# Patient Record
Sex: FEMALE | Race: WHITE | NOT HISPANIC OR LATINO
[De-identification: names, ages, dates, MRNs, and addresses within clinical notes are randomized per-mention and may not be internally consistent; named-entity substitution may affect disease eponyms.]

---

## 2017-01-04 ENCOUNTER — LABORATORY RESULT (OUTPATIENT)
Age: 72
End: 2017-01-04

## 2017-01-04 ENCOUNTER — APPOINTMENT (OUTPATIENT)
Dept: GYNECOLOGIC ONCOLOGY | Facility: CLINIC | Age: 72
End: 2017-01-04

## 2017-01-04 VITALS
HEIGHT: 55 IN | BODY MASS INDEX: 23.84 KG/M2 | DIASTOLIC BLOOD PRESSURE: 93 MMHG | WEIGHT: 103 LBS | HEART RATE: 75 BPM | SYSTOLIC BLOOD PRESSURE: 136 MMHG

## 2017-01-05 LAB
ALBUMIN SERPL ELPH-MCNC: 3.7 G/DL
ALP BLD-CCNC: 54 U/L
ALT SERPL-CCNC: 14 U/L
ANION GAP SERPL CALC-SCNC: 16 MMOL/L
APPEARANCE: CLEAR
AST SERPL-CCNC: 24 U/L
BACTERIA: NEGATIVE
BASOPHILS # BLD AUTO: 0.02 K/UL
BASOPHILS NFR BLD AUTO: 0.6 %
BILIRUB SERPL-MCNC: 0.3 MG/DL
BILIRUBIN URINE: NEGATIVE
BLOOD URINE: NEGATIVE
BUN SERPL-MCNC: 13 MG/DL
CALCIUM SERPL-MCNC: 9.3 MG/DL
CANCER AG125 SERPL-ACNC: 22 U/ML
CHLORIDE SERPL-SCNC: 99 MMOL/L
CO2 SERPL-SCNC: 24 MMOL/L
COLOR: ABNORMAL
CREAT SERPL-MCNC: 0.77 MG/DL
EOSINOPHIL # BLD AUTO: 0.05 K/UL
EOSINOPHIL NFR BLD AUTO: 1.6 %
GLUCOSE QUALITATIVE U: NORMAL MG/DL
GLUCOSE SERPL-MCNC: 99 MG/DL
HCT VFR BLD CALC: 36.4 %
HGB BLD-MCNC: 11.8 G/DL
HYALINE CASTS: 1 /LPF
IMM GRANULOCYTES NFR BLD AUTO: 0.3 %
KETONES URINE: NEGATIVE
LEUKOCYTE ESTERASE URINE: NEGATIVE
LYMPHOCYTES # BLD AUTO: 0.81 K/UL
LYMPHOCYTES NFR BLD AUTO: 25.3 %
MAN DIFF?: NORMAL
MCHC RBC-ENTMCNC: 32.4 GM/DL
MCHC RBC-ENTMCNC: 34.4 PG
MCV RBC AUTO: 106.1 FL
MICROSCOPIC-UA: NORMAL
MONOCYTES # BLD AUTO: 0.24 K/UL
MONOCYTES NFR BLD AUTO: 7.5 %
NEUTROPHILS # BLD AUTO: 2.07 K/UL
NEUTROPHILS NFR BLD AUTO: 64.7 %
NITRITE URINE: NEGATIVE
PH URINE: 6.5
PLATELET # BLD AUTO: 226 K/UL
POTASSIUM SERPL-SCNC: 4.7 MMOL/L
PROT SERPL-MCNC: 6.3 G/DL
PROTEIN URINE: NEGATIVE MG/DL
RBC # BLD: 3.43 M/UL
RBC # FLD: 15.8 %
RED BLOOD CELLS URINE: 11 /HPF
SODIUM SERPL-SCNC: 139 MMOL/L
SPECIFIC GRAVITY URINE: 1.01
SQUAMOUS EPITHELIAL CELLS: 2 /HPF
UROBILINOGEN URINE: NORMAL MG/DL
WBC # FLD AUTO: 3.2 K/UL
WHITE BLOOD CELLS URINE: 2 /HPF

## 2017-01-09 RX ORDER — DIPHENHYDRAMINE HCL 50 MG
25 CAPSULE ORAL ONCE
Qty: 0 | Refills: 0 | Status: DISCONTINUED | OUTPATIENT
Start: 2017-01-10 | End: 2017-01-25

## 2017-01-09 RX ORDER — BEVACIZUMAB 400 MG/16ML
350 INJECTION, SOLUTION INTRAVENOUS ONCE
Qty: 0 | Refills: 0 | Status: DISCONTINUED | OUTPATIENT
Start: 2017-01-10 | End: 2017-01-25

## 2017-01-09 RX ORDER — DOXORUBICIN HYDROCHLORIDE 2 MG/ML
28 INJECTABLE, LIPOSOMAL INTRAVENOUS ONCE
Qty: 0 | Refills: 0 | Status: DISCONTINUED | OUTPATIENT
Start: 2017-01-10 | End: 2017-01-25

## 2017-01-09 RX ORDER — DEXAMETHASONE 0.5 MG/5ML
20 ELIXIR ORAL ONCE
Qty: 0 | Refills: 0 | Status: DISCONTINUED | OUTPATIENT
Start: 2017-01-10 | End: 2017-01-25

## 2017-01-10 ENCOUNTER — OUTPATIENT (OUTPATIENT)
Dept: OUTPATIENT SERVICES | Facility: HOSPITAL | Age: 72
LOS: 1 days | End: 2017-01-10
Payer: MEDICARE

## 2017-01-10 DIAGNOSIS — R11.2 NAUSEA WITH VOMITING, UNSPECIFIED: ICD-10-CM

## 2017-01-10 DIAGNOSIS — C54.1 MALIGNANT NEOPLASM OF ENDOMETRIUM: ICD-10-CM

## 2017-01-10 DIAGNOSIS — Z98.89 OTHER SPECIFIED POSTPROCEDURAL STATES: Chronic | ICD-10-CM

## 2017-01-10 PROCEDURE — 96413 CHEMO IV INFUSION 1 HR: CPT

## 2017-01-10 PROCEDURE — 96417 CHEMO IV INFUS EACH ADDL SEQ: CPT

## 2017-01-10 PROCEDURE — 96375 TX/PRO/DX INJ NEW DRUG ADDON: CPT

## 2017-01-10 RX ORDER — FAMOTIDINE 10 MG/ML
20 INJECTION INTRAVENOUS ONCE
Qty: 0 | Refills: 0 | Status: DISCONTINUED | OUTPATIENT
Start: 2017-01-10 | End: 2017-01-25

## 2017-01-10 RX ORDER — FAMOTIDINE 10 MG/ML
20 INJECTION INTRAVENOUS ONCE
Qty: 0 | Refills: 0 | Status: DISCONTINUED | OUTPATIENT
Start: 2017-01-10 | End: 2017-01-10

## 2017-01-11 ENCOUNTER — APPOINTMENT (OUTPATIENT)
Dept: GYNECOLOGIC ONCOLOGY | Facility: CLINIC | Age: 72
End: 2017-01-11

## 2017-01-20 ENCOUNTER — APPOINTMENT (OUTPATIENT)
Dept: GYNECOLOGIC ONCOLOGY | Facility: CLINIC | Age: 72
End: 2017-01-20

## 2017-01-20 ENCOUNTER — OUTPATIENT (OUTPATIENT)
Dept: OUTPATIENT SERVICES | Facility: HOSPITAL | Age: 72
LOS: 1 days | End: 2017-01-20
Payer: MEDICARE

## 2017-01-20 VITALS
HEIGHT: 55 IN | SYSTOLIC BLOOD PRESSURE: 162 MMHG | BODY MASS INDEX: 24.99 KG/M2 | DIASTOLIC BLOOD PRESSURE: 81 MMHG | HEART RATE: 74 BPM | WEIGHT: 108 LBS

## 2017-01-20 DIAGNOSIS — Z98.89 OTHER SPECIFIED POSTPROCEDURAL STATES: Chronic | ICD-10-CM

## 2017-01-20 PROCEDURE — 93970 EXTREMITY STUDY: CPT

## 2017-01-20 PROCEDURE — 93970 EXTREMITY STUDY: CPT | Mod: 26

## 2017-01-22 ENCOUNTER — EMERGENCY (EMERGENCY)
Facility: HOSPITAL | Age: 72
LOS: 1 days | Discharge: HOME | End: 2017-01-22
Attending: EMERGENCY MEDICINE | Admitting: EMERGENCY MEDICINE
Payer: MEDICARE

## 2017-01-22 VITALS
OXYGEN SATURATION: 100 % | RESPIRATION RATE: 18 BRPM | SYSTOLIC BLOOD PRESSURE: 139 MMHG | HEART RATE: 75 BPM | DIASTOLIC BLOOD PRESSURE: 88 MMHG | TEMPERATURE: 98 F | WEIGHT: 110.01 LBS

## 2017-01-22 VITALS
DIASTOLIC BLOOD PRESSURE: 90 MMHG | SYSTOLIC BLOOD PRESSURE: 107 MMHG | HEART RATE: 72 BPM | OXYGEN SATURATION: 100 % | TEMPERATURE: 98 F | RESPIRATION RATE: 18 BRPM

## 2017-01-22 DIAGNOSIS — Z79.2 LONG TERM (CURRENT) USE OF ANTIBIOTICS: ICD-10-CM

## 2017-01-22 DIAGNOSIS — S90.821A BLISTER (NONTHERMAL), RIGHT FOOT, INITIAL ENCOUNTER: ICD-10-CM

## 2017-01-22 DIAGNOSIS — R23.8 OTHER SKIN CHANGES: ICD-10-CM

## 2017-01-22 DIAGNOSIS — Z98.89 OTHER SPECIFIED POSTPROCEDURAL STATES: Chronic | ICD-10-CM

## 2017-01-22 DIAGNOSIS — Z79.1 LONG TERM (CURRENT) USE OF NON-STEROIDAL ANTI-INFLAMMATORIES (NSAID): ICD-10-CM

## 2017-01-22 DIAGNOSIS — M79.89 OTHER SPECIFIED SOFT TISSUE DISORDERS: ICD-10-CM

## 2017-01-22 DIAGNOSIS — S90.822A BLISTER (NONTHERMAL), LEFT FOOT, INITIAL ENCOUNTER: ICD-10-CM

## 2017-01-22 DIAGNOSIS — I89.0 LYMPHEDEMA, NOT ELSEWHERE CLASSIFIED: ICD-10-CM

## 2017-01-22 DIAGNOSIS — Z79.899 OTHER LONG TERM (CURRENT) DRUG THERAPY: ICD-10-CM

## 2017-01-22 LAB
ALBUMIN SERPL ELPH-MCNC: 2.7 G/DL — LOW (ref 3.4–5)
ALP SERPL-CCNC: 53 U/L — SIGNIFICANT CHANGE UP (ref 40–120)
ALT FLD-CCNC: 18 U/L — SIGNIFICANT CHANGE UP (ref 12–42)
ANION GAP SERPL CALC-SCNC: 7 MMOL/L — LOW (ref 9–16)
APPEARANCE UR: CLEAR — SIGNIFICANT CHANGE UP
APTT BLD: 27.8 SEC — SIGNIFICANT CHANGE UP (ref 27.5–37.4)
AST SERPL-CCNC: 17 U/L — SIGNIFICANT CHANGE UP (ref 15–37)
BASOPHILS NFR BLD AUTO: 0.4 % — SIGNIFICANT CHANGE UP (ref 0–2)
BILIRUB SERPL-MCNC: 0.4 MG/DL — SIGNIFICANT CHANGE UP (ref 0.2–1.2)
BILIRUB UR-MCNC: NEGATIVE — SIGNIFICANT CHANGE UP
BUN SERPL-MCNC: 20 MG/DL — SIGNIFICANT CHANGE UP (ref 7–23)
CALCIUM SERPL-MCNC: 8.5 MG/DL — SIGNIFICANT CHANGE UP (ref 8.5–10.5)
CHLORIDE SERPL-SCNC: 100 MMOL/L — SIGNIFICANT CHANGE UP (ref 96–108)
CO2 SERPL-SCNC: 29 MMOL/L — SIGNIFICANT CHANGE UP (ref 22–31)
COLOR SPEC: YELLOW — SIGNIFICANT CHANGE UP
CREAT SERPL-MCNC: 0.86 MG/DL — SIGNIFICANT CHANGE UP (ref 0.5–1.3)
DIFF PNL FLD: NEGATIVE — SIGNIFICANT CHANGE UP
EOSINOPHIL NFR BLD AUTO: 0.7 % — SIGNIFICANT CHANGE UP (ref 0–6)
GLUCOSE SERPL-MCNC: 104 MG/DL — HIGH (ref 70–99)
GLUCOSE UR QL: NEGATIVE — SIGNIFICANT CHANGE UP
HCT VFR BLD CALC: 30.2 % — LOW (ref 34.5–45)
HGB BLD-MCNC: 10.3 G/DL — LOW (ref 11.5–15.5)
INR BLD: 1.08 — SIGNIFICANT CHANGE UP (ref 0.88–1.16)
KETONES UR-MCNC: NEGATIVE — SIGNIFICANT CHANGE UP
LEUKOCYTE ESTERASE UR-ACNC: NEGATIVE — SIGNIFICANT CHANGE UP
LYMPHOCYTES # BLD AUTO: 12.9 % — LOW (ref 13–44)
MCHC RBC-ENTMCNC: 34.1 G/DL — SIGNIFICANT CHANGE UP (ref 32–36)
MCHC RBC-ENTMCNC: 34.6 PG — HIGH (ref 27–34)
MCV RBC AUTO: 101.3 FL — HIGH (ref 80–100)
MONOCYTES NFR BLD AUTO: 3.7 % — SIGNIFICANT CHANGE UP (ref 2–14)
NEUTROPHILS NFR BLD AUTO: 82.3 % — HIGH (ref 43–77)
NITRITE UR-MCNC: NEGATIVE — SIGNIFICANT CHANGE UP
PH UR: 7.5 — SIGNIFICANT CHANGE UP (ref 4–8)
PLATELET # BLD AUTO: 211 K/UL — SIGNIFICANT CHANGE UP (ref 150–400)
POTASSIUM SERPL-MCNC: 4 MMOL/L — SIGNIFICANT CHANGE UP (ref 3.5–5.3)
POTASSIUM SERPL-SCNC: 4 MMOL/L — SIGNIFICANT CHANGE UP (ref 3.5–5.3)
PROT SERPL-MCNC: 6.4 G/DL — SIGNIFICANT CHANGE UP (ref 6.4–8.2)
PROT UR-MCNC: NEGATIVE MG/DL — SIGNIFICANT CHANGE UP
PROTHROM AB SERPL-ACNC: 12 SEC — SIGNIFICANT CHANGE UP (ref 10–13.1)
RBC # BLD: 2.98 M/UL — LOW (ref 3.8–5.2)
RBC # FLD: 15.6 % — SIGNIFICANT CHANGE UP (ref 10.3–16.9)
SODIUM SERPL-SCNC: 136 MMOL/L — SIGNIFICANT CHANGE UP (ref 135–145)
SP GR SPEC: 1.01 — SIGNIFICANT CHANGE UP (ref 1–1.03)
UROBILINOGEN FLD QL: 0.2 E.U./DL — SIGNIFICANT CHANGE UP
WBC # BLD: 4.6 K/UL — SIGNIFICANT CHANGE UP (ref 3.8–10.5)
WBC # FLD AUTO: 4.6 K/UL — SIGNIFICANT CHANGE UP (ref 3.8–10.5)

## 2017-01-22 PROCEDURE — 81003 URINALYSIS AUTO W/O SCOPE: CPT

## 2017-01-22 PROCEDURE — 80053 COMPREHEN METABOLIC PANEL: CPT

## 2017-01-22 PROCEDURE — 85610 PROTHROMBIN TIME: CPT

## 2017-01-22 PROCEDURE — 99284 EMERGENCY DEPT VISIT MOD MDM: CPT | Mod: 25

## 2017-01-22 PROCEDURE — 85025 COMPLETE CBC W/AUTO DIFF WBC: CPT

## 2017-01-22 PROCEDURE — 83880 ASSAY OF NATRIURETIC PEPTIDE: CPT

## 2017-01-22 PROCEDURE — 87086 URINE CULTURE/COLONY COUNT: CPT

## 2017-01-22 PROCEDURE — 85730 THROMBOPLASTIN TIME PARTIAL: CPT

## 2017-01-22 PROCEDURE — 99284 EMERGENCY DEPT VISIT MOD MDM: CPT

## 2017-01-22 RX ORDER — CEPHALEXIN 500 MG
1 CAPSULE ORAL
Qty: 0 | Refills: 0 | COMMUNITY

## 2017-01-22 RX ORDER — RALOXIFENE HYDROCHLORIDE 60 MG/1
1 TABLET, COATED ORAL
Qty: 0 | Refills: 0 | COMMUNITY

## 2017-01-22 NOTE — ED PROVIDER NOTE - PHYSICAL EXAMINATION
CONSTITUTIONAL: Well appearing, well nourished, awake, alert and in no apparent distress.  ENMT: Airway patent.  HEAD: Head is atraumatic. Head shape is symmetrical.  EYES: Clear bilaterally. Normal EOMI.  CARDIAC: Normal rate, regular rhythm.  Heart sounds S1, S2.   RESPIRATORY: Breath sounds clear and equal bilaterally.  GASTROINTESTINAL: Abdomen soft, non-tender, no guarding.  MUSCULOSKELETAL: Spine appears normal, range of motion is not limited, no muscle or joint tenderness. Bilateral feet swelling, 1+ pitting w/ calluses and blisters to LLE.   NEUROLOGICAL: Alert and oriented, no focal deficits, no motor or sensory deficits.  SKIN: Skin normal color for race, warm, dry and intact. No evidence of rash.  PSYCHIATRIC: Alert and oriented to person, place, time/situation. normal mood and affect. no apparent risk to self or others. CONSTITUTIONAL: Well appearing, well nourished, awake, alert and in no apparent distress.  ENMT: Airway patent.  HEAD: Head is atraumatic. Head shape is symmetrical.  EYES: Clear bilaterally. Normal EOMI.  CARDIAC: Normal rate, regular rhythm.  Heart sounds S1, S2.   RESPIRATORY: Breath sounds clear and equal bilaterally.  GASTROINTESTINAL: Abdomen soft, non-tender, no guarding.  MUSCULOSKELETAL: Spine appears normal, range of motion is not limited, no muscle or joint tenderness. Bilateral feet swelling, 3+ pitting w/ calluses and small blisters bilaterally. Largest blister on left 6x4cm foot. LUE excoriations on hand  NEUROLOGICAL: Alert and oriented, no focal deficits, no motor or sensory deficits.  SKIN: Skin normal color for race, warm, dry and intact. No evidence of rash.  PSYCHIATRIC: Alert and oriented to person, place, time/situation. normal mood and affect. no apparent risk to self or others.

## 2017-01-22 NOTE — ED ADULT NURSE NOTE - OBJECTIVE STATEMENT
Pt has been on chemotherapy since november 2016, she has had 4 rounds of chemo per pt, and with each cycle she has been feeling increased generalized weakness, BLE swelling with blisters to BLE and feet. Pt with large blister to left medial first toe that is filled with fluid skin still intact. pt denies fever/chills., able to ambulate slowly and bear weight, +pedal pulses +2 bilaterally. +4 BLE edema. Pt denies chest pain 0/10, shortness of breathe, itchiness, nausea/vomiting.

## 2017-01-22 NOTE — CONSULT NOTE ADULT - PROBLEM SELECTOR RECOMMENDATION 9
-Serous blisters fenestrated and fluid drained.   -Triple abx ointment with DSD consisting of 4x4, curlex applied  -betadine applied to interspaces for the maceration  -WBAT  -Follow up with podiatrist as outpatient

## 2017-01-22 NOTE — ED PROVIDER NOTE - OBJECTIVE STATEMENT
70 yo female w/ bilateral leg swelling, most concerned about blisters to feet. States due to blisters and swelling unable to wear compression stockings. No shortness of breath, chest pain. 72 yo female w/ bilateral leg swelling, most concerned about blisters to feet that have been worsening over the last few days. States due to blisters and swelling unable to wear compression stockings recently. No shortness of breath, chest pain.

## 2017-01-22 NOTE — ED PROVIDER NOTE - MEDICAL DECISION MAKING DETAILS
Pt with hx of endometrial cancer with bilateral blisters, largest on L. States unable to wear compression stockings as a result. No sob, cp. Podiatry consulted, drained and wrapped blisters. Has hx of lymphedema which pt states is unchanged, do not suspect acute HF exacerbation given chronic symptoms although slightly elevated BNP, lungs CTAB. Discussed with PCP's office, will have pt follow up this week.

## 2017-01-22 NOTE — CONSULT NOTE ADULT - SUBJECTIVE AND OBJECTIVE BOX
72yo female with medical history of lymphedema, endometrial cancer with lung mets presents to ED with chief complaint of b/l foot painful blisters. Pt notes that since her diagnosis in 2015, she has been through surgery and chemo. She further notes that this past October on her latest CT scan, they found that she has b/l lung nodules for which she was started on chemo again (Doxo and vastin). Pt notes that since her chemo, she started getting painful foot blisters. Pt also notes that she has not been able to wear her compression stockings for her lymphedema because of the blisters. Pt denies any nausea, vomiting, fevers, chills, SOB.     Allergies: no known drug or food allergies  Surgical history: hysterectomy  Social history: denies drinking, denies smoking  ROS: negative    ICU Vital Signs Last 24 Hrs  T(C): 36.4, Max: 36.7 (01-22 @ 10:47)  T(F): 97.5, Max: 98 (01-22 @ 10:47)  HR: 69 (69 - 75)  BP: 156/95 (139/88 - 156/95)  BP(mean): --  ABP: --  ABP(mean): --  RR: 18 (18 - 18)  SpO2: 98% (98% - 100%)                          10.3   4.6   )-----------( 211      ( 22 Jan 2017 11:58 )             30.2   22 Jan 2017 11:58    136    |  100    |  20     ----------------------------<  104    4.0     |  29     |  0.86     Ca    8.5        22 Jan 2017 11:58    TPro  6.4    /  Alb  2.7    /  TBili  0.4    /  DBili  x      /  AST  17     /  ALT  18     /  AlkPhos  53     22 Jan 2017 11:58    Physical Exam:  Pt is a pleasant 72yo female, well nourished and well groomed. Pt is awake, alert and oriented to time, place, person  Vascular: palpable DP/PT 1/4, CFT brisk x 10, TG WNL, pedal hair diminished  Neuro: protective sensation intact  Derm: Left foot: +2 pitting edema, serous bulla on the medial aspect of foot measuring ~6cm x 4cm, serous blister on the distal tip of the 3rd digit, serous blister noted on the dorsum of the 5th digit spanning across the digit lateral and plantarly. interdigital maceration in all interspaces. macerated fissure on heel with macerated surrounding skin. no malodor, no purulence noted  Right foot: blister on dorsal-lateral aspect of 5th digit, serous bulla that measures ~[id number] on the dorsum of the hallux spanning medially and plantarly, small serous blister on the medial eminence of the 1st tarsometatarsal joint. Interdigital maceration to all interspaces.

## 2017-01-23 LAB
CULTURE RESULTS: NO GROWTH — SIGNIFICANT CHANGE UP
SPECIMEN SOURCE: SIGNIFICANT CHANGE UP

## 2017-01-31 ENCOUNTER — OUTPATIENT (OUTPATIENT)
Dept: OUTPATIENT SERVICES | Facility: HOSPITAL | Age: 72
LOS: 1 days | End: 2017-01-31
Payer: MEDICARE

## 2017-01-31 DIAGNOSIS — Z98.89 OTHER SPECIFIED POSTPROCEDURAL STATES: Chronic | ICD-10-CM

## 2017-01-31 PROCEDURE — 74177 CT ABD & PELVIS W/CONTRAST: CPT

## 2017-01-31 PROCEDURE — 74177 CT ABD & PELVIS W/CONTRAST: CPT | Mod: 26

## 2017-01-31 PROCEDURE — 71260 CT THORAX DX C+: CPT | Mod: 26

## 2017-01-31 PROCEDURE — 71260 CT THORAX DX C+: CPT

## 2017-02-01 ENCOUNTER — APPOINTMENT (OUTPATIENT)
Dept: GYNECOLOGIC ONCOLOGY | Facility: CLINIC | Age: 72
End: 2017-02-01

## 2017-02-15 ENCOUNTER — APPOINTMENT (OUTPATIENT)
Dept: GYNECOLOGIC ONCOLOGY | Facility: CLINIC | Age: 72
End: 2017-02-15

## 2017-02-15 ENCOUNTER — LABORATORY RESULT (OUTPATIENT)
Age: 72
End: 2017-02-15

## 2017-02-15 VITALS
DIASTOLIC BLOOD PRESSURE: 65 MMHG | HEART RATE: 61 BPM | WEIGHT: 108 LBS | BODY MASS INDEX: 24.99 KG/M2 | HEIGHT: 55 IN | SYSTOLIC BLOOD PRESSURE: 143 MMHG

## 2017-02-16 LAB
ALBUMIN SERPL ELPH-MCNC: 3.3 G/DL
ALP BLD-CCNC: 122 U/L
ALT SERPL-CCNC: 17 U/L
ANION GAP SERPL CALC-SCNC: 17 MMOL/L
APPEARANCE: ABNORMAL
AST SERPL-CCNC: 27 U/L
BASOPHILS # BLD AUTO: 0.05 K/UL
BASOPHILS NFR BLD AUTO: 0.9 %
BILIRUB SERPL-MCNC: 0.4 MG/DL
BILIRUBIN URINE: NEGATIVE
BLOOD URINE: NEGATIVE
BUN SERPL-MCNC: 21 MG/DL
CALCIUM SERPL-MCNC: 9.5 MG/DL
CANCER AG125 SERPL-ACNC: 21 U/ML
CHLORIDE SERPL-SCNC: 96 MMOL/L
CO2 SERPL-SCNC: 23 MMOL/L
COLOR: ABNORMAL
CREAT SERPL-MCNC: 0.87 MG/DL
EOSINOPHIL # BLD AUTO: 0.09 K/UL
EOSINOPHIL NFR BLD AUTO: 1.8 %
GLUCOSE QUALITATIVE U: NORMAL MG/DL
GLUCOSE SERPL-MCNC: 80 MG/DL
HCT VFR BLD CALC: 35.1 %
HGB BLD-MCNC: 11 G/DL
KETONES URINE: NEGATIVE
LEUKOCYTE ESTERASE URINE: NEGATIVE
LYMPHOCYTES # BLD AUTO: 0.51 K/UL
LYMPHOCYTES NFR BLD AUTO: 9.7 %
MAN DIFF?: NORMAL
MCHC RBC-ENTMCNC: 31.3 GM/DL
MCHC RBC-ENTMCNC: 34.3 PG
MCV RBC AUTO: 109.3 FL
MONOCYTES # BLD AUTO: 0.05 K/UL
MONOCYTES NFR BLD AUTO: 0.9 %
NEUTROPHILS # BLD AUTO: 4.37 K/UL
NEUTROPHILS NFR BLD AUTO: 83.2 %
NITRITE URINE: NEGATIVE
PH URINE: 7.5
PLATELET # BLD AUTO: 210 K/UL
POTASSIUM SERPL-SCNC: 4.7 MMOL/L
PROT SERPL-MCNC: 6.2 G/DL
PROTEIN URINE: NEGATIVE MG/DL
RBC # BLD: 3.21 M/UL
RBC # FLD: 16.1 %
SODIUM SERPL-SCNC: 136 MMOL/L
SPECIFIC GRAVITY URINE: 1.03
UROBILINOGEN URINE: NORMAL MG/DL
WBC # FLD AUTO: 5.25 K/UL

## 2017-02-22 ENCOUNTER — APPOINTMENT (OUTPATIENT)
Dept: GYNECOLOGIC ONCOLOGY | Facility: CLINIC | Age: 72
End: 2017-02-22

## 2017-02-28 ENCOUNTER — APPOINTMENT (OUTPATIENT)
Dept: GYNECOLOGIC ONCOLOGY | Facility: CLINIC | Age: 72
End: 2017-02-28

## 2017-02-28 ENCOUNTER — OTHER (OUTPATIENT)
Age: 72
End: 2017-02-28

## 2017-02-28 ENCOUNTER — OUTPATIENT (OUTPATIENT)
Dept: OUTPATIENT SERVICES | Facility: HOSPITAL | Age: 72
LOS: 1 days | End: 2017-02-28
Payer: MEDICARE

## 2017-02-28 VITALS
WEIGHT: 108 LBS | BODY MASS INDEX: 24.99 KG/M2 | SYSTOLIC BLOOD PRESSURE: 152 MMHG | HEART RATE: 61 BPM | DIASTOLIC BLOOD PRESSURE: 83 MMHG | HEIGHT: 55 IN

## 2017-02-28 DIAGNOSIS — R07.9 CHEST PAIN, UNSPECIFIED: ICD-10-CM

## 2017-02-28 DIAGNOSIS — Z98.89 OTHER SPECIFIED POSTPROCEDURAL STATES: Chronic | ICD-10-CM

## 2017-02-28 LAB
ALBUMIN SERPL ELPH-MCNC: 3.4 G/DL — SIGNIFICANT CHANGE UP (ref 3.4–5)
ALP SERPL-CCNC: 264 U/L — HIGH (ref 40–120)
ALT FLD-CCNC: 23 U/L — SIGNIFICANT CHANGE UP (ref 12–42)
ANION GAP SERPL CALC-SCNC: 11 MMOL/L — SIGNIFICANT CHANGE UP (ref 9–16)
APTT BLD: 29.6 SEC — SIGNIFICANT CHANGE UP (ref 27.5–37.4)
AST SERPL-CCNC: 35 U/L — SIGNIFICANT CHANGE UP (ref 15–37)
BASOPHILS NFR BLD AUTO: 0.4 % — SIGNIFICANT CHANGE UP (ref 0–2)
BILIRUB SERPL-MCNC: 0.5 MG/DL — SIGNIFICANT CHANGE UP (ref 0.2–1.2)
BUN SERPL-MCNC: 22 MG/DL — SIGNIFICANT CHANGE UP (ref 7–23)
CALCIUM SERPL-MCNC: 8.9 MG/DL — SIGNIFICANT CHANGE UP (ref 8.5–10.5)
CHLORIDE SERPL-SCNC: 96 MMOL/L — SIGNIFICANT CHANGE UP (ref 96–108)
CO2 SERPL-SCNC: 28 MMOL/L — SIGNIFICANT CHANGE UP (ref 22–31)
CREAT SERPL-MCNC: 0.73 MG/DL — SIGNIFICANT CHANGE UP (ref 0.5–1.3)
EOSINOPHIL NFR BLD AUTO: 0.7 % — SIGNIFICANT CHANGE UP (ref 0–6)
GLUCOSE SERPL-MCNC: 93 MG/DL — SIGNIFICANT CHANGE UP (ref 70–99)
HCT VFR BLD CALC: 35.1 % — SIGNIFICANT CHANGE UP (ref 34.5–45)
HGB BLD-MCNC: 12 G/DL — SIGNIFICANT CHANGE UP (ref 11.5–15.5)
INR BLD: 1.03 — SIGNIFICANT CHANGE UP (ref 0.88–1.16)
LDH SERPL L TO P-CCNC: 273 U/L — HIGH (ref 84–246)
LYMPHOCYTES # BLD AUTO: 11.9 % — LOW (ref 13–44)
MAGNESIUM SERPL-MCNC: 2.1 MG/DL — SIGNIFICANT CHANGE UP (ref 1.6–2.4)
MCHC RBC-ENTMCNC: 34.2 G/DL — SIGNIFICANT CHANGE UP (ref 32–36)
MCHC RBC-ENTMCNC: 34.9 PG — HIGH (ref 27–34)
MCV RBC AUTO: 102 FL — HIGH (ref 80–100)
MONOCYTES NFR BLD AUTO: 5.1 % — SIGNIFICANT CHANGE UP (ref 2–14)
NEUTROPHILS NFR BLD AUTO: 81.9 % — HIGH (ref 43–77)
PHOSPHATE SERPL-MCNC: 3.4 MG/DL — SIGNIFICANT CHANGE UP (ref 2.5–4.5)
PLATELET # BLD AUTO: 246 K/UL — SIGNIFICANT CHANGE UP (ref 150–400)
POTASSIUM SERPL-MCNC: 4.4 MMOL/L — SIGNIFICANT CHANGE UP (ref 3.5–5.3)
POTASSIUM SERPL-SCNC: 4.4 MMOL/L — SIGNIFICANT CHANGE UP (ref 3.5–5.3)
PROT SERPL-MCNC: 6.8 G/DL — SIGNIFICANT CHANGE UP (ref 6.4–8.2)
PROTHROM AB SERPL-ACNC: 11.4 SEC — SIGNIFICANT CHANGE UP (ref 10–13.1)
RBC # BLD: 3.44 M/UL — LOW (ref 3.8–5.2)
RBC # FLD: 13.6 % — SIGNIFICANT CHANGE UP (ref 10.3–16.9)
SODIUM SERPL-SCNC: 135 MMOL/L — SIGNIFICANT CHANGE UP (ref 135–145)
WBC # BLD: 7.6 K/UL — SIGNIFICANT CHANGE UP (ref 3.8–10.5)
WBC # FLD AUTO: 7.6 K/UL — SIGNIFICANT CHANGE UP (ref 3.8–10.5)

## 2017-02-28 PROCEDURE — 84100 ASSAY OF PHOSPHORUS: CPT

## 2017-02-28 PROCEDURE — 83615 LACTATE (LD) (LDH) ENZYME: CPT

## 2017-02-28 PROCEDURE — 93010 ELECTROCARDIOGRAM REPORT: CPT

## 2017-02-28 PROCEDURE — 85025 COMPLETE CBC W/AUTO DIFF WBC: CPT

## 2017-02-28 PROCEDURE — 85730 THROMBOPLASTIN TIME PARTIAL: CPT

## 2017-02-28 PROCEDURE — 83735 ASSAY OF MAGNESIUM: CPT

## 2017-02-28 PROCEDURE — 85610 PROTHROMBIN TIME: CPT

## 2017-02-28 PROCEDURE — 93005 ELECTROCARDIOGRAM TRACING: CPT

## 2017-02-28 PROCEDURE — 80053 COMPREHEN METABOLIC PANEL: CPT

## 2017-03-06 ENCOUNTER — FORM ENCOUNTER (OUTPATIENT)
Age: 72
End: 2017-03-06

## 2017-03-07 ENCOUNTER — OUTPATIENT (OUTPATIENT)
Dept: OUTPATIENT SERVICES | Facility: HOSPITAL | Age: 72
LOS: 1 days | End: 2017-03-07
Payer: MEDICARE

## 2017-03-07 ENCOUNTER — APPOINTMENT (OUTPATIENT)
Dept: INTERNAL MEDICINE | Facility: CLINIC | Age: 72
End: 2017-03-07

## 2017-03-07 VITALS
HEART RATE: 60 BPM | OXYGEN SATURATION: 98 % | DIASTOLIC BLOOD PRESSURE: 78 MMHG | SYSTOLIC BLOOD PRESSURE: 140 MMHG | TEMPERATURE: 97.6 F

## 2017-03-07 DIAGNOSIS — Z98.89 OTHER SPECIFIED POSTPROCEDURAL STATES: Chronic | ICD-10-CM

## 2017-03-07 PROCEDURE — 73521 X-RAY EXAM HIPS BI 2 VIEWS: CPT

## 2017-03-07 PROCEDURE — 73521 X-RAY EXAM HIPS BI 2 VIEWS: CPT | Mod: 26

## 2017-03-08 ENCOUNTER — APPOINTMENT (OUTPATIENT)
Dept: GYNECOLOGIC ONCOLOGY | Facility: CLINIC | Age: 72
End: 2017-03-08

## 2017-03-15 ENCOUNTER — APPOINTMENT (OUTPATIENT)
Dept: RADIATION ONCOLOGY | Facility: CLINIC | Age: 72
End: 2017-03-15

## 2017-03-15 ENCOUNTER — APPOINTMENT (OUTPATIENT)
Dept: ORTHOPEDIC SURGERY | Facility: CLINIC | Age: 72
End: 2017-03-15

## 2017-03-16 ENCOUNTER — TRANSCRIPTION ENCOUNTER (OUTPATIENT)
Age: 72
End: 2017-03-16

## 2017-03-29 ENCOUNTER — APPOINTMENT (OUTPATIENT)
Dept: ORTHOPEDIC SURGERY | Facility: CLINIC | Age: 72
End: 2017-03-29

## 2017-04-05 ENCOUNTER — APPOINTMENT (OUTPATIENT)
Dept: GYNECOLOGIC ONCOLOGY | Facility: CLINIC | Age: 72
End: 2017-04-05

## 2017-04-05 VITALS
HEART RATE: 66 BPM | DIASTOLIC BLOOD PRESSURE: 80 MMHG | HEIGHT: 55 IN | BODY MASS INDEX: 25.69 KG/M2 | WEIGHT: 111 LBS | SYSTOLIC BLOOD PRESSURE: 175 MMHG

## 2017-04-19 ENCOUNTER — APPOINTMENT (OUTPATIENT)
Dept: ORTHOPEDIC SURGERY | Facility: CLINIC | Age: 72
End: 2017-04-19

## 2017-04-19 DIAGNOSIS — M25.551 PAIN IN RIGHT HIP: ICD-10-CM

## 2017-04-19 DIAGNOSIS — M25.552 PAIN IN RIGHT HIP: ICD-10-CM

## 2017-05-02 ENCOUNTER — OTHER (OUTPATIENT)
Age: 72
End: 2017-05-02

## 2017-05-10 ENCOUNTER — OUTPATIENT (OUTPATIENT)
Dept: OUTPATIENT SERVICES | Facility: HOSPITAL | Age: 72
LOS: 1 days | End: 2017-05-10
Payer: MEDICARE

## 2017-05-10 DIAGNOSIS — Z98.89 OTHER SPECIFIED POSTPROCEDURAL STATES: Chronic | ICD-10-CM

## 2017-05-10 PROCEDURE — 71260 CT THORAX DX C+: CPT | Mod: 26

## 2017-05-10 PROCEDURE — 74177 CT ABD & PELVIS W/CONTRAST: CPT

## 2017-05-10 PROCEDURE — 74177 CT ABD & PELVIS W/CONTRAST: CPT | Mod: 26

## 2017-05-10 PROCEDURE — 71260 CT THORAX DX C+: CPT

## 2017-05-17 ENCOUNTER — APPOINTMENT (OUTPATIENT)
Dept: ORTHOPEDIC SURGERY | Facility: CLINIC | Age: 72
End: 2017-05-17

## 2017-05-31 ENCOUNTER — APPOINTMENT (OUTPATIENT)
Dept: GYNECOLOGIC ONCOLOGY | Facility: CLINIC | Age: 72
End: 2017-05-31

## 2017-06-07 ENCOUNTER — OUTPATIENT (OUTPATIENT)
Dept: OUTPATIENT SERVICES | Facility: HOSPITAL | Age: 72
LOS: 1 days | End: 2017-06-07
Payer: MEDICARE

## 2017-06-07 DIAGNOSIS — Z98.89 OTHER SPECIFIED POSTPROCEDURAL STATES: Chronic | ICD-10-CM

## 2017-06-07 DIAGNOSIS — C54.1 MALIGNANT NEOPLASM OF ENDOMETRIUM: ICD-10-CM

## 2017-06-07 PROCEDURE — 96372 THER/PROPH/DIAG INJ SC/IM: CPT

## 2017-06-07 RX ORDER — FULVESTRANT 50 MG/ML
500 INJECTION INTRAMUSCULAR ONCE
Qty: 0 | Refills: 0 | Status: DISCONTINUED | OUTPATIENT
Start: 2017-06-07 | End: 2017-06-22

## 2017-06-13 ENCOUNTER — RESULT CHARGE (OUTPATIENT)
Age: 72
End: 2017-06-13

## 2017-06-13 ENCOUNTER — APPOINTMENT (OUTPATIENT)
Dept: ORTHOPEDIC SURGERY | Facility: CLINIC | Age: 72
End: 2017-06-13

## 2017-06-13 RX ORDER — DOXYCYCLINE HYCLATE 100 MG/1
100 CAPSULE ORAL
Qty: 60 | Refills: 0 | Status: COMPLETED | COMMUNITY
Start: 2017-03-21

## 2017-06-13 RX ORDER — SILVER SULFADIAZINE 10 G/1000G
1 CREAM TOPICAL
Qty: 50 | Refills: 0 | Status: COMPLETED | COMMUNITY
Start: 2017-01-24

## 2017-06-20 ENCOUNTER — OUTPATIENT (OUTPATIENT)
Dept: OUTPATIENT SERVICES | Facility: HOSPITAL | Age: 72
LOS: 1 days | End: 2017-06-20
Payer: MEDICARE

## 2017-06-20 DIAGNOSIS — Z98.89 OTHER SPECIFIED POSTPROCEDURAL STATES: Chronic | ICD-10-CM

## 2017-06-20 DIAGNOSIS — C54.1 MALIGNANT NEOPLASM OF ENDOMETRIUM: ICD-10-CM

## 2017-06-20 PROCEDURE — 96401 CHEMO ANTI-NEOPL SQ/IM: CPT

## 2017-06-20 RX ORDER — FULVESTRANT 50 MG/ML
500 INJECTION INTRAMUSCULAR ONCE
Qty: 0 | Refills: 0 | Status: DISCONTINUED | OUTPATIENT
Start: 2017-06-20 | End: 2017-07-05

## 2017-07-05 ENCOUNTER — OUTPATIENT (OUTPATIENT)
Dept: OUTPATIENT SERVICES | Facility: HOSPITAL | Age: 72
LOS: 1 days | End: 2017-07-05
Payer: MEDICARE

## 2017-07-05 DIAGNOSIS — C54.1 MALIGNANT NEOPLASM OF ENDOMETRIUM: ICD-10-CM

## 2017-07-05 DIAGNOSIS — Z98.89 OTHER SPECIFIED POSTPROCEDURAL STATES: Chronic | ICD-10-CM

## 2017-07-05 PROCEDURE — 96401 CHEMO ANTI-NEOPL SQ/IM: CPT

## 2017-07-05 RX ORDER — FULVESTRANT 50 MG/ML
500 INJECTION INTRAMUSCULAR ONCE
Qty: 0 | Refills: 0 | Status: DISCONTINUED | OUTPATIENT
Start: 2017-07-05 | End: 2017-07-20

## 2017-07-11 ENCOUNTER — APPOINTMENT (OUTPATIENT)
Dept: ORTHOPEDIC SURGERY | Facility: CLINIC | Age: 72
End: 2017-07-11

## 2017-07-26 ENCOUNTER — APPOINTMENT (OUTPATIENT)
Dept: GYNECOLOGIC ONCOLOGY | Facility: CLINIC | Age: 72
End: 2017-07-26

## 2017-07-26 VITALS
RESPIRATION RATE: 15 BRPM | DIASTOLIC BLOOD PRESSURE: 79 MMHG | HEIGHT: 55 IN | HEART RATE: 67 BPM | WEIGHT: 110 LBS | BODY MASS INDEX: 25.46 KG/M2 | SYSTOLIC BLOOD PRESSURE: 124 MMHG

## 2017-08-02 ENCOUNTER — OUTPATIENT (OUTPATIENT)
Dept: OUTPATIENT SERVICES | Facility: HOSPITAL | Age: 72
LOS: 1 days | End: 2017-08-02
Payer: MEDICARE

## 2017-08-02 DIAGNOSIS — C54.1 MALIGNANT NEOPLASM OF ENDOMETRIUM: ICD-10-CM

## 2017-08-02 DIAGNOSIS — Z98.89 OTHER SPECIFIED POSTPROCEDURAL STATES: Chronic | ICD-10-CM

## 2017-08-02 LAB
ALBUMIN SERPL ELPH-MCNC: 3.5 G/DL — SIGNIFICANT CHANGE UP (ref 3.3–5)
ALP SERPL-CCNC: 95 U/L — SIGNIFICANT CHANGE UP (ref 40–120)
ALT FLD-CCNC: 20 U/L — SIGNIFICANT CHANGE UP (ref 10–45)
ANION GAP SERPL CALC-SCNC: 10 MMOL/L — SIGNIFICANT CHANGE UP (ref 5–17)
AST SERPL-CCNC: 31 U/L — SIGNIFICANT CHANGE UP (ref 10–40)
BASOPHILS NFR BLD AUTO: 0.2 % — SIGNIFICANT CHANGE UP (ref 0–2)
BILIRUB SERPL-MCNC: <0.2 MG/DL — SIGNIFICANT CHANGE UP (ref 0.2–1.2)
BUN SERPL-MCNC: 26 MG/DL — HIGH (ref 7–23)
CALCIUM SERPL-MCNC: 10 MG/DL — SIGNIFICANT CHANGE UP (ref 8.4–10.5)
CHLORIDE SERPL-SCNC: 100 MMOL/L — SIGNIFICANT CHANGE UP (ref 96–108)
CO2 SERPL-SCNC: 28 MMOL/L — SIGNIFICANT CHANGE UP (ref 22–31)
CREAT SERPL-MCNC: 0.9 MG/DL — SIGNIFICANT CHANGE UP (ref 0.5–1.3)
EOSINOPHIL NFR BLD AUTO: 3.6 % — SIGNIFICANT CHANGE UP (ref 0–6)
GLUCOSE SERPL-MCNC: 96 MG/DL — SIGNIFICANT CHANGE UP (ref 70–99)
HCT VFR BLD CALC: 35.9 % — SIGNIFICANT CHANGE UP (ref 34.5–45)
HGB BLD-MCNC: 12 G/DL — SIGNIFICANT CHANGE UP (ref 11.5–15.5)
LYMPHOCYTES # BLD AUTO: 22.1 % — SIGNIFICANT CHANGE UP (ref 13–44)
MCHC RBC-ENTMCNC: 33.2 PG — SIGNIFICANT CHANGE UP (ref 27–34)
MCHC RBC-ENTMCNC: 33.4 G/DL — SIGNIFICANT CHANGE UP (ref 32–36)
MCV RBC AUTO: 99.4 FL — SIGNIFICANT CHANGE UP (ref 80–100)
MONOCYTES NFR BLD AUTO: 6.2 % — SIGNIFICANT CHANGE UP (ref 2–14)
NEUTROPHILS NFR BLD AUTO: 67.9 % — SIGNIFICANT CHANGE UP (ref 43–77)
PLATELET # BLD AUTO: 160 K/UL — SIGNIFICANT CHANGE UP (ref 150–400)
POTASSIUM SERPL-MCNC: 4.6 MMOL/L — SIGNIFICANT CHANGE UP (ref 3.5–5.3)
POTASSIUM SERPL-SCNC: 4.6 MMOL/L — SIGNIFICANT CHANGE UP (ref 3.5–5.3)
PROT SERPL-MCNC: 6.6 G/DL — SIGNIFICANT CHANGE UP (ref 6–8.3)
RBC # BLD: 3.61 M/UL — LOW (ref 3.8–5.2)
RBC # FLD: 13.1 % — SIGNIFICANT CHANGE UP (ref 10.3–16.9)
SODIUM SERPL-SCNC: 138 MMOL/L — SIGNIFICANT CHANGE UP (ref 135–145)
WBC # BLD: 4.7 K/UL — SIGNIFICANT CHANGE UP (ref 3.8–10.5)
WBC # FLD AUTO: 4.7 K/UL — SIGNIFICANT CHANGE UP (ref 3.8–10.5)

## 2017-08-02 PROCEDURE — 86304 IMMUNOASSAY TUMOR CA 125: CPT

## 2017-08-02 PROCEDURE — 96372 THER/PROPH/DIAG INJ SC/IM: CPT

## 2017-08-02 PROCEDURE — 80053 COMPREHEN METABOLIC PANEL: CPT

## 2017-08-02 PROCEDURE — 85025 COMPLETE CBC W/AUTO DIFF WBC: CPT

## 2017-08-02 PROCEDURE — 36415 COLL VENOUS BLD VENIPUNCTURE: CPT

## 2017-08-02 RX ORDER — FULVESTRANT 50 MG/ML
500 INJECTION INTRAMUSCULAR ONCE
Qty: 0 | Refills: 0 | Status: DISCONTINUED | OUTPATIENT
Start: 2017-08-02 | End: 2017-08-17

## 2017-08-03 LAB — CANCER AG125 SERPL-ACNC: 21 U/ML — SIGNIFICANT CHANGE UP

## 2017-08-22 ENCOUNTER — APPOINTMENT (OUTPATIENT)
Dept: ORTHOPEDIC SURGERY | Facility: CLINIC | Age: 72
End: 2017-08-22
Payer: MEDICARE

## 2017-08-22 DIAGNOSIS — S32.501G: ICD-10-CM

## 2017-08-22 PROCEDURE — 72170 X-RAY EXAM OF PELVIS: CPT

## 2017-08-22 PROCEDURE — 99213 OFFICE O/P EST LOW 20 MIN: CPT

## 2017-08-29 RX ORDER — FULVESTRANT 50 MG/ML
500 INJECTION INTRAMUSCULAR ONCE
Qty: 0 | Refills: 0 | Status: DISCONTINUED | OUTPATIENT
Start: 2017-08-30 | End: 2017-09-14

## 2017-08-30 ENCOUNTER — OUTPATIENT (OUTPATIENT)
Dept: OUTPATIENT SERVICES | Facility: HOSPITAL | Age: 72
LOS: 1 days | End: 2017-08-30
Payer: MEDICARE

## 2017-08-30 DIAGNOSIS — C54.1 MALIGNANT NEOPLASM OF ENDOMETRIUM: ICD-10-CM

## 2017-08-30 DIAGNOSIS — Z98.89 OTHER SPECIFIED POSTPROCEDURAL STATES: Chronic | ICD-10-CM

## 2017-08-30 LAB
ALBUMIN SERPL ELPH-MCNC: 3.8 G/DL — SIGNIFICANT CHANGE UP (ref 3.3–5)
ALP SERPL-CCNC: 91 U/L — SIGNIFICANT CHANGE UP (ref 40–120)
ALT FLD-CCNC: 18 U/L — SIGNIFICANT CHANGE UP (ref 10–45)
ANION GAP SERPL CALC-SCNC: 10 MMOL/L — SIGNIFICANT CHANGE UP (ref 5–17)
AST SERPL-CCNC: 28 U/L — SIGNIFICANT CHANGE UP (ref 10–40)
BASOPHILS NFR BLD AUTO: 0.4 % — SIGNIFICANT CHANGE UP (ref 0–2)
BILIRUB SERPL-MCNC: 0.2 MG/DL — SIGNIFICANT CHANGE UP (ref 0.2–1.2)
BUN SERPL-MCNC: 27 MG/DL — HIGH (ref 7–23)
CALCIUM SERPL-MCNC: 9.9 MG/DL — SIGNIFICANT CHANGE UP (ref 8.4–10.5)
CANCER AG125 SERPL-ACNC: 25 U/ML — SIGNIFICANT CHANGE UP
CHLORIDE SERPL-SCNC: 97 MMOL/L — SIGNIFICANT CHANGE UP (ref 96–108)
CO2 SERPL-SCNC: 29 MMOL/L — SIGNIFICANT CHANGE UP (ref 22–31)
CREAT SERPL-MCNC: 0.9 MG/DL — SIGNIFICANT CHANGE UP (ref 0.5–1.3)
EOSINOPHIL NFR BLD AUTO: 5.8 % — SIGNIFICANT CHANGE UP (ref 0–6)
GLUCOSE SERPL-MCNC: 86 MG/DL — SIGNIFICANT CHANGE UP (ref 70–99)
HCT VFR BLD CALC: 35 % — SIGNIFICANT CHANGE UP (ref 34.5–45)
HGB BLD-MCNC: 12 G/DL — SIGNIFICANT CHANGE UP (ref 11.5–15.5)
LYMPHOCYTES # BLD AUTO: 26.1 % — SIGNIFICANT CHANGE UP (ref 13–44)
MCHC RBC-ENTMCNC: 34 PG — SIGNIFICANT CHANGE UP (ref 27–34)
MCHC RBC-ENTMCNC: 34.3 G/DL — SIGNIFICANT CHANGE UP (ref 32–36)
MCV RBC AUTO: 99.2 FL — SIGNIFICANT CHANGE UP (ref 80–100)
MONOCYTES NFR BLD AUTO: 8.6 % — SIGNIFICANT CHANGE UP (ref 2–14)
NEUTROPHILS NFR BLD AUTO: 59.1 % — SIGNIFICANT CHANGE UP (ref 43–77)
PLATELET # BLD AUTO: 174 K/UL — SIGNIFICANT CHANGE UP (ref 150–400)
POTASSIUM SERPL-MCNC: 4.5 MMOL/L — SIGNIFICANT CHANGE UP (ref 3.5–5.3)
POTASSIUM SERPL-SCNC: 4.5 MMOL/L — SIGNIFICANT CHANGE UP (ref 3.5–5.3)
PROT SERPL-MCNC: 6.7 G/DL — SIGNIFICANT CHANGE UP (ref 6–8.3)
RBC # BLD: 3.53 M/UL — LOW (ref 3.8–5.2)
RBC # FLD: 13 % — SIGNIFICANT CHANGE UP (ref 10.3–16.9)
SODIUM SERPL-SCNC: 136 MMOL/L — SIGNIFICANT CHANGE UP (ref 135–145)
WBC # BLD: 4.7 K/UL — SIGNIFICANT CHANGE UP (ref 3.8–10.5)
WBC # FLD AUTO: 4.7 K/UL — SIGNIFICANT CHANGE UP (ref 3.8–10.5)

## 2017-08-30 PROCEDURE — 80053 COMPREHEN METABOLIC PANEL: CPT

## 2017-08-30 PROCEDURE — 36415 COLL VENOUS BLD VENIPUNCTURE: CPT

## 2017-08-30 PROCEDURE — 85025 COMPLETE CBC W/AUTO DIFF WBC: CPT

## 2017-08-30 PROCEDURE — 86304 IMMUNOASSAY TUMOR CA 125: CPT

## 2017-08-30 PROCEDURE — 96401 CHEMO ANTI-NEOPL SQ/IM: CPT

## 2017-09-05 ENCOUNTER — APPOINTMENT (OUTPATIENT)
Dept: ORTHOPEDIC SURGERY | Facility: CLINIC | Age: 72
End: 2017-09-05
Payer: MEDICARE

## 2017-09-05 VITALS
WEIGHT: 109.03 LBS | BODY MASS INDEX: 23.52 KG/M2 | HEART RATE: 58 BPM | SYSTOLIC BLOOD PRESSURE: 126 MMHG | HEIGHT: 57 IN | OXYGEN SATURATION: 96 % | DIASTOLIC BLOOD PRESSURE: 82 MMHG

## 2017-09-05 PROCEDURE — 99215 OFFICE O/P EST HI 40 MIN: CPT

## 2017-09-15 ENCOUNTER — RESULT REVIEW (OUTPATIENT)
Age: 72
End: 2017-09-15

## 2017-09-19 ENCOUNTER — OUTPATIENT (OUTPATIENT)
Dept: OUTPATIENT SERVICES | Facility: HOSPITAL | Age: 72
LOS: 1 days | End: 2017-09-19
Payer: MEDICARE

## 2017-09-19 DIAGNOSIS — Z98.89 OTHER SPECIFIED POSTPROCEDURAL STATES: Chronic | ICD-10-CM

## 2017-09-19 PROCEDURE — 74177 CT ABD & PELVIS W/CONTRAST: CPT

## 2017-09-19 PROCEDURE — 71260 CT THORAX DX C+: CPT | Mod: 26

## 2017-09-19 PROCEDURE — 71260 CT THORAX DX C+: CPT

## 2017-09-19 PROCEDURE — 74177 CT ABD & PELVIS W/CONTRAST: CPT | Mod: 26

## 2017-09-20 ENCOUNTER — OUTPATIENT (OUTPATIENT)
Dept: OUTPATIENT SERVICES | Facility: HOSPITAL | Age: 72
LOS: 1 days | End: 2017-09-20
Payer: MEDICARE

## 2017-09-20 DIAGNOSIS — Z98.89 OTHER SPECIFIED POSTPROCEDURAL STATES: Chronic | ICD-10-CM

## 2017-09-20 DIAGNOSIS — M81.0 AGE-RELATED OSTEOPOROSIS WITHOUT CURRENT PATHOLOGICAL FRACTURE: ICD-10-CM

## 2017-09-20 PROCEDURE — 96372 THER/PROPH/DIAG INJ SC/IM: CPT

## 2017-09-20 RX ORDER — DENOSUMAB 60 MG/ML
60 INJECTION SUBCUTANEOUS ONCE
Qty: 0 | Refills: 0 | Status: DISCONTINUED | OUTPATIENT
Start: 2017-09-20 | End: 2017-10-05

## 2017-09-27 ENCOUNTER — APPOINTMENT (OUTPATIENT)
Dept: GYNECOLOGIC ONCOLOGY | Facility: CLINIC | Age: 72
End: 2017-09-27
Payer: MEDICARE

## 2017-09-27 VITALS
DIASTOLIC BLOOD PRESSURE: 83 MMHG | BODY MASS INDEX: 22.87 KG/M2 | SYSTOLIC BLOOD PRESSURE: 147 MMHG | OXYGEN SATURATION: 96 % | HEART RATE: 76 BPM | HEIGHT: 57 IN | WEIGHT: 106 LBS

## 2017-09-27 PROCEDURE — 99214 OFFICE O/P EST MOD 30 MIN: CPT

## 2017-09-27 PROCEDURE — 36415 COLL VENOUS BLD VENIPUNCTURE: CPT

## 2017-10-02 LAB
ALBUMIN SERPL ELPH-MCNC: 3.8 G/DL
ALP BLD-CCNC: 92 U/L
ALT SERPL-CCNC: 23 U/L
ANION GAP SERPL CALC-SCNC: 13 MMOL/L
APTT BLD: 29.4 SEC
AST SERPL-CCNC: 35 U/L
BASOPHILS # BLD AUTO: 0.03 K/UL
BASOPHILS NFR BLD AUTO: 0.9 %
BILIRUB SERPL-MCNC: 0.5 MG/DL
BUN SERPL-MCNC: 19 MG/DL
CALCIUM SERPL-MCNC: 9.6 MG/DL
CANCER AG125 SERPL-ACNC: 24 U/ML
CHLORIDE SERPL-SCNC: 98 MMOL/L
CO2 SERPL-SCNC: 25 MMOL/L
CREAT SERPL-MCNC: 0.92 MG/DL
EOSINOPHIL # BLD AUTO: 0.16 K/UL
EOSINOPHIL NFR BLD AUTO: 4.7 %
HCT VFR BLD CALC: 41.5 %
HGB BLD-MCNC: 13.1 G/DL
IMM GRANULOCYTES NFR BLD AUTO: 0 %
INR PPP: 0.91 RATIO
LYMPHOCYTES # BLD AUTO: 1.17 K/UL
LYMPHOCYTES NFR BLD AUTO: 34.2 %
MAN DIFF?: NORMAL
MCHC RBC-ENTMCNC: 31.6 GM/DL
MCHC RBC-ENTMCNC: 32.7 PG
MCV RBC AUTO: 103.5 FL
MONOCYTES # BLD AUTO: 0.31 K/UL
MONOCYTES NFR BLD AUTO: 9.1 %
NEUTROPHILS # BLD AUTO: 1.75 K/UL
NEUTROPHILS NFR BLD AUTO: 51.1 %
PLATELET # BLD AUTO: 198 K/UL
POTASSIUM SERPL-SCNC: 4.4 MMOL/L
PROT SERPL-MCNC: 6.6 G/DL
PT BLD: 10.3 SEC
RBC # BLD: 4.01 M/UL
RBC # FLD: 13.6 %
SODIUM SERPL-SCNC: 136 MMOL/L
WBC # FLD AUTO: 3.42 K/UL

## 2017-10-03 ENCOUNTER — APPOINTMENT (OUTPATIENT)
Dept: ORTHOPEDIC SURGERY | Facility: CLINIC | Age: 72
End: 2017-10-03
Payer: MEDICARE

## 2017-10-03 PROCEDURE — 99213 OFFICE O/P EST LOW 20 MIN: CPT

## 2017-10-03 RX ORDER — RALOXIFENE HYDROCHLORIDE 60 MG/1
TABLET ORAL
Refills: 0 | Status: COMPLETED | COMMUNITY

## 2017-10-03 RX ORDER — DENOSUMAB 60 MG/ML
60 INJECTION SUBCUTANEOUS
Refills: 0 | Status: ACTIVE | COMMUNITY

## 2017-10-31 ENCOUNTER — APPOINTMENT (OUTPATIENT)
Dept: OPHTHALMOLOGY | Facility: CLINIC | Age: 72
End: 2017-10-31

## 2017-10-31 ENCOUNTER — OUTPATIENT (OUTPATIENT)
Dept: OUTPATIENT SERVICES | Facility: HOSPITAL | Age: 72
LOS: 1 days | End: 2017-10-31

## 2017-10-31 DIAGNOSIS — Z98.89 OTHER SPECIFIED POSTPROCEDURAL STATES: Chronic | ICD-10-CM

## 2017-11-01 ENCOUNTER — APPOINTMENT (OUTPATIENT)
Dept: GYNECOLOGIC ONCOLOGY | Facility: CLINIC | Age: 72
End: 2017-11-01
Payer: MEDICARE

## 2017-11-01 VITALS
HEIGHT: 69 IN | HEART RATE: 79 BPM | DIASTOLIC BLOOD PRESSURE: 85 MMHG | BODY MASS INDEX: 15.7 KG/M2 | OXYGEN SATURATION: 96 % | WEIGHT: 106 LBS | SYSTOLIC BLOOD PRESSURE: 141 MMHG

## 2017-11-01 DIAGNOSIS — H40.033 ANATOMICAL NARROW ANGLE, BILATERAL: ICD-10-CM

## 2017-11-01 PROCEDURE — 99214 OFFICE O/P EST MOD 30 MIN: CPT

## 2018-02-13 ENCOUNTER — APPOINTMENT (OUTPATIENT)
Dept: GYNECOLOGIC ONCOLOGY | Facility: CLINIC | Age: 73
End: 2018-02-13
Payer: MEDICARE

## 2018-02-13 PROCEDURE — 99214 OFFICE O/P EST MOD 30 MIN: CPT

## 2018-02-14 ENCOUNTER — APPOINTMENT (OUTPATIENT)
Dept: GYNECOLOGIC ONCOLOGY | Facility: CLINIC | Age: 73
End: 2018-02-14
Payer: MEDICARE

## 2018-03-20 ENCOUNTER — OUTPATIENT (OUTPATIENT)
Dept: OUTPATIENT SERVICES | Facility: HOSPITAL | Age: 73
LOS: 1 days | End: 2018-03-20
Payer: MEDICARE

## 2018-03-20 ENCOUNTER — APPOINTMENT (OUTPATIENT)
Dept: INFUSION THERAPY | Facility: HOSPITAL | Age: 73
End: 2018-03-20

## 2018-03-20 ENCOUNTER — APPOINTMENT (OUTPATIENT)
Dept: ORTHOPEDIC SURGERY | Facility: CLINIC | Age: 73
End: 2018-03-20
Payer: MEDICARE

## 2018-03-20 VITALS
HEART RATE: 79 BPM | HEIGHT: 56.5 IN | OXYGEN SATURATION: 98 % | DIASTOLIC BLOOD PRESSURE: 60 MMHG | WEIGHT: 106 LBS | SYSTOLIC BLOOD PRESSURE: 110 MMHG | BODY MASS INDEX: 23.19 KG/M2

## 2018-03-20 DIAGNOSIS — M81.0 AGE-RELATED OSTEOPOROSIS WITHOUT CURRENT PATHOLOGICAL FRACTURE: ICD-10-CM

## 2018-03-20 DIAGNOSIS — Z98.89 OTHER SPECIFIED POSTPROCEDURAL STATES: Chronic | ICD-10-CM

## 2018-03-20 PROCEDURE — 96372 THER/PROPH/DIAG INJ SC/IM: CPT

## 2018-03-20 PROCEDURE — 99214 OFFICE O/P EST MOD 30 MIN: CPT

## 2018-03-20 RX ORDER — DENOSUMAB 60 MG/ML
60 INJECTION SUBCUTANEOUS ONCE
Qty: 0 | Refills: 0 | Status: DISCONTINUED | OUTPATIENT
Start: 2018-03-20 | End: 2018-04-04

## 2018-05-02 ENCOUNTER — APPOINTMENT (OUTPATIENT)
Dept: INTERNAL MEDICINE | Facility: CLINIC | Age: 73
End: 2018-05-02
Payer: MEDICARE

## 2018-05-02 VITALS
OXYGEN SATURATION: 98 % | WEIGHT: 102 LBS | BODY MASS INDEX: 22.31 KG/M2 | HEIGHT: 56.5 IN | DIASTOLIC BLOOD PRESSURE: 76 MMHG | HEART RATE: 64 BPM | TEMPERATURE: 97.3 F | SYSTOLIC BLOOD PRESSURE: 112 MMHG

## 2018-05-02 DIAGNOSIS — L97.211 NON-PRESSURE CHRONIC ULCER OF RIGHT CALF LIMITED TO BREAKDOWN OF SKIN: ICD-10-CM

## 2018-05-02 DIAGNOSIS — T49.95XA: ICD-10-CM

## 2018-05-02 DIAGNOSIS — Z78.9 OTHER SPECIFIED HEALTH STATUS: ICD-10-CM

## 2018-05-02 DIAGNOSIS — M79.604 PAIN IN RIGHT LEG: ICD-10-CM

## 2018-05-02 DIAGNOSIS — Z80.3 FAMILY HISTORY OF MALIGNANT NEOPLASM OF BREAST: ICD-10-CM

## 2018-05-02 DIAGNOSIS — Z01.00 ENCOUNTER FOR EXAMINATION OF EYES AND VISION W/OUT ABNORMAL FINDINGS: ICD-10-CM

## 2018-05-02 DIAGNOSIS — H40.009 PREGLAUCOMA, UNSPECIFIED, UNSPECIFIED EYE: ICD-10-CM

## 2018-05-02 DIAGNOSIS — Z87.81 PERSONAL HISTORY OF (HEALED) TRAUMATIC FRACTURE: ICD-10-CM

## 2018-05-02 DIAGNOSIS — M84.454D: ICD-10-CM

## 2018-05-02 DIAGNOSIS — E87.1 HYPO-OSMOLALITY AND HYPONATREMIA: ICD-10-CM

## 2018-05-02 DIAGNOSIS — F32.9 MAJOR DEPRESSIVE DISORDER, SINGLE EPISODE, UNSPECIFIED: ICD-10-CM

## 2018-05-02 DIAGNOSIS — R21 RASH AND OTHER NONSPECIFIC SKIN ERUPTION: ICD-10-CM

## 2018-05-02 DIAGNOSIS — Z87.898 PERSONAL HISTORY OF OTHER SPECIFIED CONDITIONS: ICD-10-CM

## 2018-05-02 DIAGNOSIS — M79.605 PAIN IN RIGHT LEG: ICD-10-CM

## 2018-05-02 PROCEDURE — 36415 COLL VENOUS BLD VENIPUNCTURE: CPT

## 2018-05-02 PROCEDURE — 99213 OFFICE O/P EST LOW 20 MIN: CPT | Mod: 25

## 2018-05-02 PROCEDURE — G0009: CPT

## 2018-05-02 PROCEDURE — 93000 ELECTROCARDIOGRAM COMPLETE: CPT

## 2018-05-02 PROCEDURE — 90732 PPSV23 VACC 2 YRS+ SUBQ/IM: CPT

## 2018-05-02 PROCEDURE — G0439: CPT

## 2018-05-03 LAB
25(OH)D3 SERPL-MCNC: 107 NG/ML
ALBUMIN SERPL ELPH-MCNC: 3.9 G/DL
ALP BLD-CCNC: 52 U/L
ALT SERPL-CCNC: 19 U/L
ANION GAP SERPL CALC-SCNC: 13 MMOL/L
AST SERPL-CCNC: 33 U/L
BILIRUB SERPL-MCNC: 0.5 MG/DL
BUN SERPL-MCNC: 17 MG/DL
CALCIUM SERPL-MCNC: 9.7 MG/DL
CHLORIDE SERPL-SCNC: 98 MMOL/L
CHOLEST SERPL-MCNC: 179 MG/DL
CHOLEST/HDLC SERPL: 2.9 RATIO
CO2 SERPL-SCNC: 24 MMOL/L
CREAT SERPL-MCNC: 0.98 MG/DL
GLUCOSE SERPL-MCNC: 92 MG/DL
HBA1C MFR BLD HPLC: 5.5 %
HDLC SERPL-MCNC: 61 MG/DL
LDLC SERPL CALC-MCNC: 105 MG/DL
OSMOLALITY SERPL: 283 MOS/KG
OSMOLALITY UR: 265 MOS/KG
POTASSIUM SERPL-SCNC: 4.3 MMOL/L
PROT SERPL-MCNC: 6.7 G/DL
SODIUM ?TM SUB UR QN: <20 MMOL/L
SODIUM SERPL-SCNC: 135 MMOL/L
TRIGL SERPL-MCNC: 65 MG/DL

## 2018-05-15 ENCOUNTER — APPOINTMENT (OUTPATIENT)
Dept: INTERNAL MEDICINE | Facility: CLINIC | Age: 73
End: 2018-05-15

## 2018-05-23 ENCOUNTER — APPOINTMENT (OUTPATIENT)
Dept: GYNECOLOGIC ONCOLOGY | Facility: CLINIC | Age: 73
End: 2018-05-23
Payer: MEDICARE

## 2018-05-23 VITALS — HEIGHT: 56 IN | HEART RATE: 67 BPM | WEIGHT: 106.5 LBS | BODY MASS INDEX: 23.96 KG/M2 | OXYGEN SATURATION: 96 %

## 2018-05-23 PROCEDURE — 99214 OFFICE O/P EST MOD 30 MIN: CPT

## 2018-05-23 RX ORDER — IBUPROFEN 200 MG/1
TABLET, COATED ORAL
Refills: 0 | Status: ACTIVE | COMMUNITY

## 2018-05-23 RX ORDER — FULVESTRANT 50 MG/ML
INJECTION INTRAMUSCULAR
Refills: 0 | Status: ACTIVE | COMMUNITY

## 2018-07-24 ENCOUNTER — INBOUND DOCUMENT (OUTPATIENT)
Age: 73
End: 2018-07-24

## 2018-08-06 ENCOUNTER — INBOUND DOCUMENT (OUTPATIENT)
Age: 73
End: 2018-08-06

## 2018-08-20 ENCOUNTER — INBOUND DOCUMENT (OUTPATIENT)
Age: 73
End: 2018-08-20

## 2018-09-04 ENCOUNTER — APPOINTMENT (OUTPATIENT)
Dept: INFUSION THERAPY | Facility: HOSPITAL | Age: 73
End: 2018-09-04

## 2018-09-04 ENCOUNTER — OUTPATIENT (OUTPATIENT)
Dept: OUTPATIENT SERVICES | Facility: HOSPITAL | Age: 73
LOS: 1 days | End: 2018-09-04
Payer: MEDICARE

## 2018-09-04 ENCOUNTER — APPOINTMENT (OUTPATIENT)
Dept: ORTHOPEDIC SURGERY | Facility: CLINIC | Age: 73
End: 2018-09-04
Payer: MEDICARE

## 2018-09-04 VITALS
SYSTOLIC BLOOD PRESSURE: 135 MMHG | DIASTOLIC BLOOD PRESSURE: 55 MMHG | WEIGHT: 106.04 LBS | OXYGEN SATURATION: 97 % | HEIGHT: 69 IN | RESPIRATION RATE: 16 BRPM | HEART RATE: 55 BPM

## 2018-09-04 VITALS
HEIGHT: 56.69 IN | DIASTOLIC BLOOD PRESSURE: 70 MMHG | BODY MASS INDEX: 22.53 KG/M2 | HEART RATE: 59 BPM | SYSTOLIC BLOOD PRESSURE: 118 MMHG | OXYGEN SATURATION: 93 % | WEIGHT: 103 LBS

## 2018-09-04 DIAGNOSIS — Z98.89 OTHER SPECIFIED POSTPROCEDURAL STATES: Chronic | ICD-10-CM

## 2018-09-04 DIAGNOSIS — M81.0 AGE-RELATED OSTEOPOROSIS WITHOUT CURRENT PATHOLOGICAL FRACTURE: ICD-10-CM

## 2018-09-04 PROCEDURE — 99214 OFFICE O/P EST MOD 30 MIN: CPT

## 2018-09-04 PROCEDURE — 96372 THER/PROPH/DIAG INJ SC/IM: CPT

## 2018-09-04 RX ORDER — DENOSUMAB 60 MG/ML
60 INJECTION SUBCUTANEOUS ONCE
Qty: 0 | Refills: 0 | Status: COMPLETED | OUTPATIENT
Start: 2018-09-04 | End: 2018-09-04

## 2018-09-04 RX ADMIN — DENOSUMAB 60 MILLIGRAM(S): 60 INJECTION SUBCUTANEOUS at 11:57

## 2018-09-17 ENCOUNTER — INBOUND DOCUMENT (OUTPATIENT)
Age: 73
End: 2018-09-17

## 2018-09-18 ENCOUNTER — APPOINTMENT (OUTPATIENT)
Dept: INTERNAL MEDICINE | Facility: CLINIC | Age: 73
End: 2018-09-18
Payer: MEDICARE

## 2018-09-18 VITALS
WEIGHT: 105.13 LBS | OXYGEN SATURATION: 96 % | BODY MASS INDEX: 23.65 KG/M2 | HEART RATE: 68 BPM | SYSTOLIC BLOOD PRESSURE: 140 MMHG | TEMPERATURE: 98 F | HEIGHT: 56 IN | DIASTOLIC BLOOD PRESSURE: 82 MMHG

## 2018-09-18 DIAGNOSIS — Z23 ENCOUNTER FOR IMMUNIZATION: ICD-10-CM

## 2018-09-18 DIAGNOSIS — Z92.29 PERSONAL HISTORY OF OTHER DRUG THERAPY: ICD-10-CM

## 2018-09-18 PROCEDURE — G0008: CPT

## 2018-09-18 PROCEDURE — 99214 OFFICE O/P EST MOD 30 MIN: CPT | Mod: 25

## 2018-09-18 PROCEDURE — 90662 IIV NO PRSV INCREASED AG IM: CPT

## 2018-09-18 NOTE — ASSESSMENT
[FreeTextEntry1] : 74 y/o female is here for f/u.\par Flu shot given.\par Pt shceduled for c-scopy! \par F/U ONC.\par COntinue supportive therapy for lymphedema.

## 2018-09-18 NOTE — PHYSICAL EXAM
[No Acute Distress] : no acute distress [Well Nourished] : well nourished [Well Developed] : well developed [Normal Voice/Communication] : normal voice/communication [Normal Sclera/Conjunctiva] : normal sclera/conjunctiva [Normal Outer Ear/Nose] : the outer ears and nose were normal in appearance [No JVD] : no jugular venous distention [No Respiratory Distress] : no respiratory distress  [Clear to Auscultation] : lungs were clear to auscultation bilaterally [Normal Rate] : normal rate  [Normal Affect] : the affect was normal [Alert and Oriented x3] : oriented to person, place, and time [de-identified] : significant B/L lymphedema

## 2018-09-18 NOTE — REVIEW OF SYSTEMS
[Chest Pain] : no chest pain [Palpitations] : no palpitations [Lower Ext Edema] : lower extremity edema [Negative] : Heme/Lymph

## 2018-09-26 ENCOUNTER — APPOINTMENT (OUTPATIENT)
Dept: GYNECOLOGIC ONCOLOGY | Facility: CLINIC | Age: 73
End: 2018-09-26
Payer: MEDICARE

## 2018-09-26 VITALS
BODY MASS INDEX: 23.48 KG/M2 | DIASTOLIC BLOOD PRESSURE: 79 MMHG | HEART RATE: 62 BPM | HEIGHT: 56 IN | OXYGEN SATURATION: 99 % | SYSTOLIC BLOOD PRESSURE: 139 MMHG | WEIGHT: 104.38 LBS

## 2018-09-26 DIAGNOSIS — N89.9 NONINFLAMMATORY DISORDER OF VAGINA, UNSPECIFIED: ICD-10-CM

## 2018-09-26 PROCEDURE — 99214 OFFICE O/P EST MOD 30 MIN: CPT

## 2018-10-15 ENCOUNTER — INBOUND DOCUMENT (OUTPATIENT)
Age: 73
End: 2018-10-15

## 2018-11-06 ENCOUNTER — RX RENEWAL (OUTPATIENT)
Age: 73
End: 2018-11-06

## 2018-11-08 ENCOUNTER — APPOINTMENT (OUTPATIENT)
Dept: ORTHOPEDIC SURGERY | Facility: CLINIC | Age: 73
End: 2018-11-08
Payer: MEDICARE

## 2018-11-08 PROCEDURE — 99214 OFFICE O/P EST MOD 30 MIN: CPT

## 2018-11-08 PROCEDURE — 73502 X-RAY EXAM HIP UNI 2-3 VIEWS: CPT | Mod: RT

## 2018-11-08 NOTE — PHYSICAL EXAM
[de-identified] : Pelvis\par Antalgic gait.\par Tender RIGHT pubic bone. Minimally tender bilateral greater trochanter and superior pelvis.\par Hip range of motion is painful on the RIGHT but not the LEFT.\par She has difficulty doing a leg lift bilaterally but more difficult on the LEFT than RIGHT.\par Significant edema bilateral lower legs which is chronic.\par Motor is intact as is sensation. [de-identified] : \par x-rays of the pelvis AP show Healed inferior pubic ramus fractures bilaterally. Where she had the prior  lateral superior pubic ramus fracture in the past there appears to be a chronic nonunion versus an new on top of an old fracture possibly.\par The acetabulum appears intact although the superior pubic ramus fracture is close to the acetabular region. No proximal femur fractures or bony lesions.

## 2018-11-08 NOTE — REVIEW OF SYSTEMS
[Lower Ext Edema] : lower extremity edema [Negative] : Heme/Lymph [FreeTextEntry5] : bilateral lymphedema

## 2018-11-08 NOTE — ASSESSMENT
[FreeTextEntry1] : 73-year-old with endometrial cancer which has apparently been stable and she has been doing very well. Last year she had pubic rami fractures. She has had radiation therapy to the pelvis. She has been on Prolene for bone density.\par This new injury occurred just getting up from a squatting position and she had acute pain in the RIGHT pelvis. It looks like there is a chronic fracture of the superior pubic ramus that did not heal well. Compared to x-rays last year there appears to be some erosion of the bone and rounding off at the fracture site. This obviously was there before this acute pain but I question whether there is an acute fracture in that area as well. There does not appear to be any proximal femur fractures.\par She should continue using the walker. I recommended that she stay home from work which she did not feel would be possible.\par She was referred for a CT scan of the pelvis to evaluate this further. I will call her with the results. I would like her to do the CT scan where she had her last one so they can compare results.\par Followup in about 2-3 weeks.

## 2018-11-08 NOTE — HISTORY OF PRESENT ILLNESS
[de-identified] : F/U for recurrent pain in pelvis. She was last seen about 1 yr ago with pathologic fx of pelvis related to XRT for endometrial adenocarcinoma.\par She had been doing very well and her pain had resolved. She was working full time. She is just about to change jobs.\par She developed pain  in the right groin 2-3 days ago after kneeling to scrub the tub and as she got up she suddenly and had pain RIGHT groin\par  She started walking with a walker since the incident. Prior to that she did not need the walker. She has pain in the RIGHT groin and on every step.\par She is on Prolia every 6 mos.\par Last month she had a CT of the chest, abdomen and pelvis by her doctors at [facility] where she's currently being treated for her cancer. Apparently everything has been very stable and fine.

## 2018-11-12 ENCOUNTER — INBOUND DOCUMENT (OUTPATIENT)
Age: 73
End: 2018-11-12

## 2018-11-21 ENCOUNTER — APPOINTMENT (OUTPATIENT)
Dept: ORTHOPEDIC SURGERY | Facility: CLINIC | Age: 73
End: 2018-11-21
Payer: MEDICARE

## 2018-11-21 VITALS — WEIGHT: 104 LBS | BODY MASS INDEX: 23.39 KG/M2 | HEIGHT: 56 IN

## 2018-11-21 PROCEDURE — 99214 OFFICE O/P EST MOD 30 MIN: CPT

## 2018-12-11 ENCOUNTER — INBOUND DOCUMENT (OUTPATIENT)
Age: 73
End: 2018-12-11

## 2018-12-11 ENCOUNTER — APPOINTMENT (OUTPATIENT)
Dept: ORTHOPEDIC SURGERY | Facility: CLINIC | Age: 73
End: 2018-12-11
Payer: MEDICARE

## 2018-12-11 VITALS
SYSTOLIC BLOOD PRESSURE: 134 MMHG | OXYGEN SATURATION: 97 % | DIASTOLIC BLOOD PRESSURE: 83 MMHG | HEIGHT: 56 IN | HEART RATE: 68 BPM

## 2018-12-11 PROCEDURE — 72170 X-RAY EXAM OF PELVIS: CPT

## 2018-12-11 PROCEDURE — 99214 OFFICE O/P EST MOD 30 MIN: CPT

## 2019-01-02 ENCOUNTER — APPOINTMENT (OUTPATIENT)
Dept: GYNECOLOGIC ONCOLOGY | Facility: CLINIC | Age: 74
End: 2019-01-02
Payer: MEDICARE

## 2019-01-02 ENCOUNTER — APPOINTMENT (OUTPATIENT)
Dept: GYNECOLOGIC ONCOLOGY | Facility: CLINIC | Age: 74
End: 2019-01-02

## 2019-01-02 VITALS
OXYGEN SATURATION: 99 % | WEIGHT: 103.13 LBS | BODY MASS INDEX: 23.2 KG/M2 | DIASTOLIC BLOOD PRESSURE: 82 MMHG | HEART RATE: 89 BPM | SYSTOLIC BLOOD PRESSURE: 138 MMHG | HEIGHT: 56 IN

## 2019-01-02 PROCEDURE — 99214 OFFICE O/P EST MOD 30 MIN: CPT

## 2019-01-09 ENCOUNTER — APPOINTMENT (OUTPATIENT)
Dept: GYNECOLOGIC ONCOLOGY | Facility: CLINIC | Age: 74
End: 2019-01-09

## 2019-02-04 DIAGNOSIS — K52.9 NONINFECTIVE GASTROENTERITIS AND COLITIS, UNSPECIFIED: ICD-10-CM

## 2019-02-04 DIAGNOSIS — Z12.11 ENCOUNTER FOR SCREENING FOR MALIGNANT NEOPLASM OF COLON: ICD-10-CM

## 2019-02-11 ENCOUNTER — INBOUND DOCUMENT (OUTPATIENT)
Age: 74
End: 2019-02-11

## 2019-03-05 ENCOUNTER — APPOINTMENT (OUTPATIENT)
Dept: ORTHOPEDIC SURGERY | Facility: CLINIC | Age: 74
End: 2019-03-05
Payer: MEDICARE

## 2019-03-05 VITALS
SYSTOLIC BLOOD PRESSURE: 126 MMHG | OXYGEN SATURATION: 98 % | WEIGHT: 105 LBS | DIASTOLIC BLOOD PRESSURE: 70 MMHG | HEIGHT: 56 IN | HEART RATE: 75 BPM | BODY MASS INDEX: 23.62 KG/M2

## 2019-03-05 PROCEDURE — 99215 OFFICE O/P EST HI 40 MIN: CPT

## 2019-03-05 NOTE — REASON FOR VISIT
[Follow-Up: _____] : a [unfilled] follow-up visit [FreeTextEntry1] : osteoporosis; hx of fragility fx; stage 4 endometrial cancer

## 2019-03-05 NOTE — ASSESSMENT
[FreeTextEntry1] : \par 74 year old woman with stage 4 metastatic endometrial adenocarcinoma, now living with disease for past 3.5 years, working part time and feeling generally well. The patient had osteoporosis and was treated for this many years prior to her diagnosis. It is probable that treatment with both regular chemotherapy, as well as aromatase inhibitor (Arimidex, albeit briefly) further exacerbated bone density. At present the patient is on an estrogen receptor antagonist and this is likely contributing to bone loss. She has sustained one fragility pelvic fracture. She has clinical osteoporosis with height loss and kyphoscoliosis. 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 I hesitate to use Forteo only because it is an "unknown" with regard to effect on bone metastasis. I did investigate and endometrial cancer does not typically metastasize to bone, but as a rule we try to avoid using Forteo in active cancer. \par I have opted to hold today's Prolia dose; by holding the dose for 2 months this will result in a rise in bone remodeling, and perhaps in the course of this simulated "accelerated remodeling" to be followed by a dose of Prolia in 2 months we may see more bone accrual. It is a bit of an experiment but should not be harmful as current bone markers are still suppressed and the rise in markers over 2 months should not be excessive.\par \par Plan:\par 1. defer Dose #4 Prolia until May 7, 2 months; bone makers to be done prior to that injection so I can see how much they nancy in the 2 months time\par 2. f/u labs 6 months after the dose, so end of Nov, with f/u visit Dec. 2019; pt can have a DXA and may get her 5th dose of prolia at that time.\par 3. it will be interesting to see if this has any impact on healing/union of the pelvic fx

## 2019-03-05 NOTE — HISTORY OF PRESENT ILLNESS
[FreeTextEntry1] : This is a 1 year f/u visit for this 74 year old woman with dx of Stage IV endometrial adenocarcinoma diagnosed Nov 2015. \par Excellent hx is available in the chart from GYN ONC and radiation oncology.\par Pertaining to the patient's visit with me, she was referred by Dr. Tracee Ling because of a spontaneous pelvic fracture in March of 2017. She had had previous RT to the area, May-June 2016 to treat the cancer.\par The patient had been on raloxifene but I advised Prolia (gyn onc said it's ok) in Sept 2017 .\par Key med hx:\par 1. prior to the pelvic fx, there is no prior hx of other fractures\par 2. there is no hx of parental hip fx\par 3. menopause approx [age]\par 4. pt states she was on Fosamax for several years, likely shortly after menopause, then she had several years off, and she states she was back on alendronate for several years but has not taken any bisphosphonate for at least past 4-5 years. Of note, she took both alendronate and raloxifene together. She was on  Raloxifene at time of initial visit to me Sept 5 2017.\par 5. as part of management of her endometrial adenocarcinoma the pt was on Arimidex for approx 3-6 months; this was discontinued in the Fall of 2016 when pt was found to have new metastatic pulmonary nodules \par 6. max height: 5' , had been 4'9", today's reading, 4'8"; has lost 3-4 inches and is kyposcoliotic\par 7. of note, pt is now on new medication: Faslodex, an estrogen receptor antagonist for treatment of her metastatic (likely hormonally responsive) cancer (this is an injection IM once a month)\par \par updated hx:\par pt did injure her pelvis Nov 2018, but w/u did not reveal a new fx\par old fx of pelvis, March 2017, is not painful; communication from Dr. 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, but as noted, patient is asymptomatic\par \par There is a DXA available from 11/2/16:\par L2, L3, L4: -2.4\par L FN: -2.4\par L TH -2.4\par \par Prolia hx:\par Prolia #1: Sept 2017\par Prolia #2: March 20, 2018\par Prolia #3: Sept 4, 2018\par \par Today, labs done 2/16/19 were rev and compared with: 8/22/18 and  2/28/18  as well as baseline labs done 9/6/17:\par glucose: 91, prior: 98,  87\par creat: 0.89, prior: 0.94,  1.00\par PTH/ca: 23/9.7, prior: 26/9.1  alb: 3.8; 14/9.7  alb: 3.9; 24/9.9\par phos:3.7, prior:  2.1, 3.7\par 25 D: 57.3, prior: 84.6, 76.3, 56\par BSAP: 10, prior: 8.2, 11, 19\par CTX: 288, prior: 217, 239, 329\par \par Labs:: 2/15/17:\par calcium: 9.5  alb:3.3\par creat: 0.87\par \par calcium: 1 pill twice a day; vitamin shoppe\par vitamin D: she stopped the A + D pill; she stopped the separate vitamin D 2000 IU/day;\par \par

## 2019-03-06 ENCOUNTER — TRANSCRIPTION ENCOUNTER (OUTPATIENT)
Age: 74
End: 2019-03-06

## 2019-03-11 ENCOUNTER — INBOUND DOCUMENT (OUTPATIENT)
Age: 74
End: 2019-03-11

## 2019-03-15 NOTE — LETTER BODY
[Dear  ___] : Dear ~EFRAIN, [Dear  ___] : Dear  [unfilled], [Attached please find my note.] : Attached please find my note.

## 2019-03-20 ENCOUNTER — APPOINTMENT (OUTPATIENT)
Dept: GYNECOLOGIC ONCOLOGY | Facility: CLINIC | Age: 74
End: 2019-03-20
Payer: MEDICARE

## 2019-03-20 VITALS
WEIGHT: 110 LBS | OXYGEN SATURATION: 93 % | SYSTOLIC BLOOD PRESSURE: 138 MMHG | HEIGHT: 56 IN | HEART RATE: 74 BPM | DIASTOLIC BLOOD PRESSURE: 83 MMHG | BODY MASS INDEX: 24.75 KG/M2

## 2019-03-20 PROCEDURE — 99214 OFFICE O/P EST MOD 30 MIN: CPT

## 2019-03-20 RX ORDER — HYDROCHLOROTHIAZIDE 12.5 MG/1
12.5 CAPSULE ORAL TWICE DAILY
Qty: 180 | Refills: 11 | Status: COMPLETED | COMMUNITY
Start: 2017-01-20 | End: 2019-03-20

## 2019-03-20 NOTE — REVIEW OF SYSTEMS
[Negative] : Musculoskeletal [FreeTextEntry5] : bilateral lower extremity lymphedema still wears stockings.

## 2019-03-20 NOTE — PAST MEDICAL HISTORY
[Postmenopausal] : The patient is postmenopausal [unknown] : the patient is unsure of the date of her LMP [Abstinence] : uses abstinence [Total Preg ___] : G[unfilled] [Live Births ___] : P[unfilled]  [Full Term ___] : Full Term: [unfilled] [FreeTextEntry5] :  [FreeTextEntry1] : fibroids.  had myomectomy

## 2019-03-20 NOTE — PHYSICAL EXAM
[Normal] : No focal neurologic defects observed [Restricted in physically strenuous activity but ambulatory and able to carry out work of a light or sedentary nature] : Status 1- Restricted in physically strenuous activity but ambulatory and able to carry out work of a light or sedentary nature, e.g., light house work, office work [de-identified] : well healed lapsy incisions [de-identified] : bilateral lymphedema worsened with bilateral pitting edema to the knee, skin intact

## 2019-03-20 NOTE — HISTORY OF PRESENT ILLNESS
[FreeTextEntry1] : Problem\par 1) Stage 4 High Grade endometrioid adenocarcinoma with mets to vagina & lung\par 2)  Ca125: 27\par 3)  Sister with Breast cancer BRCA 2+ ([id number] deleterious mutation), patient BRCA neg ([facility])\par \par Previous Therapy\par 1) Pelvic Ultrasound 9/29/15\par      a) unremarkable, vaginal mass not visualized\par 2) MRI Pelvis 10/20/15\par         a) well circumscribed lesion 1.0x1.7x1.5cm within the mid to lower third vagina which appears to be         arising from the posterior (clinically anterior) wall. No extension or lymphadenopathy. Biopsy recommended\par 3) Vaginal biopsy 11/4/15\par      a) High grade adenocarcinoma, endometrioid type, favor endometrial primary\par 4) PET/CT 11/10/15\par     a) Hypermetabolic mass with the posterior wall of the lower uterine segment 4x5.1x2cm. Smaller lesion located in the posterior vaginal wall seen as an enhancing mass measuring 1.1x1.7cm is also hypermetabolic. No lymphadenopathy noted. There is hypermetabolic lesion within the right lung. Although this likely represents metastatic disease, a second primary cannot be excluded. \par 5) Lung Biopsy 11/19/15\par       a) high grade endometrial adenocarcinoma\par 6) Advanced robotic assisted hysterectomy, BSO, pelvic and para aortic lymph node dissection 11/24/15\par      a) Endometrial Adenocarcinoma, FIGO grade 3 %, +LVSI, +vaginal mass\par      b) Post operative course complicated by wound seroma\par 7) CT C/A/P 12/2015 (post op)\par      a) stable 1.5cm lung nodule\par 8) Paclitaxel 175mg/m2 and Carboplatin AUC 6 q21d x 6 cycles\par      a)1/5/16-4/19/16\par 9) CT C/A/P 5/9/16\par     a) Stable ground glass nodule 1.5cm in left upper lobe. \par 10) Vaginal Brachytherapy with Dr. Patel\par     a)initiated 4/ 2016- 5/12/16 completed \par 11)  Arimidex initiated 5/2016- current\par 12) CT Chest 8/12/16\par     a) Stable ground glass nodules 1.5cm in DANIEL, interval decrease in tree in bud nodularity and bronchiolectasis in RUL and RML, stable multiple subcentimeter nodules in LLL and LML, likely inflammatory or infectious.\par 13) CT C/A/P 10/26/16\par    a) Increase in the size and number of lung nodules bilaterally suspicious for metastatic disease.\par 14) Doxil 20mg/m2, Avastin 7.5mg/kg q14 days initiated 11/15/16 x 3 cycles\par      a) C3d15 held due to skin toxicity, 1/10/17\par 15) CT C/A/P 1/31/17\par     a) Mixed response. Several scattered stable pulmonary nodules, 4mm 2mm and two intervally enlarged pulmonary nodules 4mm (from 3mm), 3mm (from 1mm) Stable ground glass opacity in DANIEL 12mm \par 16) Hip Xray 3/7/17\par    a) Bilateral acute, minimally displaced superior pubic ramus fractures. Suspected acute non displaced left inferior pubic ramus fracture.\par 17) CT C/A/P 5/10/17\par    a) mild interval enlargement of pulmonary mets, no other disease\par 18) Faslodex initiated 6/7/17-8/20/17\par 19)CT CAP 9/19/2017\par    a)Significant progression of pulmonary metastasis to b/l pulmonary masses\par    b)Upper left lobe lesion previously 6mm now 12mm, RU lobe nodule previously 3mm now 7mm, and new mass on LL lobe of 2.3cm, nodule in JENNIFER lobe remains stable at 18mm since 5/2016\par 20)CTAP 3/07/2018\par    a)Stable small residua of b/l pulmonary mets\par    b)No new foci of disease \par 21)CTAP 6/28/18\par    a)Since 3/7/18 b/l pulmonary nodules unchanged\par    b)JEFF\par 22) CT CH/ABD/PEL 10/2018\par    a)Slightly increased size of RLL pulmonary mets now 0.6x0.6cm, previously 0.5x0.4cm.\par    b)DANIEL focal, nodular ground glass opacity spans 1.6x1.6cm increased from 1.3x1.3cm, both possible mets or possibly early or in situ lung ca\par 23)CTAP 11/14/2018\par    a)Since 10/2018 unchanged of 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 \par \par \par Pt presents today for follow-up. She is c/o worsening edema on her b/l extremities. Otherwise feeling well without N/V, change in bowel function or urinary function. No vaginal bleeding/discharge. Still working almost full time. \par \par Health Maintenance\par Mammo 12/2018- Birads 2\par colonoscopy-

## 2019-03-20 NOTE — DISCUSSION/SUMMARY
[Reviewed Clinical Lab Test(s)] : Results of clinical tests were reviewed. [FreeTextEntry1] : Lymphedema acutely worsening last 3 weeks \par CT C/A/P pending, will follow up results from [facility] to r/o recurrence\par Increase HCTZ 25 mg q12\par Restart Lymphedema physical therapy\par Patient to call us on Monday to let us know if her symptoms are improving

## 2019-04-02 PROBLEM — S32.591K: Status: ACTIVE | Noted: 2018-11-08

## 2019-04-03 ENCOUNTER — APPOINTMENT (OUTPATIENT)
Dept: ORTHOPEDIC SURGERY | Facility: CLINIC | Age: 74
End: 2019-04-03
Payer: MEDICARE

## 2019-04-03 DIAGNOSIS — S32.591K: ICD-10-CM

## 2019-04-03 PROCEDURE — 99213 OFFICE O/P EST LOW 20 MIN: CPT

## 2019-04-03 NOTE — ASSESSMENT
[FreeTextEntry1] : 74-year-old with pathologic fracture of the pubic rami and sacrum. 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 Given the lack of pain her symptoms I would not recommend further treatment. At this point in time I would not expect it to heal.\par She is seeing a metabolic bone specialist.\par She can followup for recurrent pain. If she starts to get pain with walking I would suggest immediately using a cane or walker to eliminate the pain and prevent her condition from being aggravated or hopefully prevent further insufficiency fractures.\par Followup as needed

## 2019-04-03 NOTE — REASON FOR VISIT
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

## 2019-04-03 NOTE — HISTORY OF PRESENT ILLNESS
[de-identified] : \par Dr. Roa states that she is feeling relatively well overall. She is no longer having any pain in the pelvis or lower back. She is walking well. She uses a cane only with stairs and commuting but in general is walking without.\par Her lymphedema in the lower extremities has gotten worse and she is getting therapy for that.\par She is on Prolia for her bone density. She is seeing Dr. Daugherty regarding the bone density.\par \par No active cancer was seen on recent imaging except lung nodule is slightly larger.She is likely going to have radiation of that.\par

## 2019-04-03 NOTE — PHYSICAL EXAM
[de-identified] : Pelvis\par \par She walks very well. There is slight waddling with the lymphedema in her legs but denies pain in the pelvis and not antalgic.\par No tenderness with palpation of the sacrum or pelvis or pubic area.\par Hip range of motion is with mild stiffness but no significant pain.\par bilateral lower extremity lymphedema [de-identified] : \par She had a CT scan performed March 19, 2019 of the chest abdomen and pelvis. In the pelvis there was persistent chronic ununited fracture of the RIGHT inferior and superior pubic rami but healed fractures of the LEFT pubic rami, Which compression L1 and L3, osteopenia and insufficiency fracture in the sacrum

## 2019-04-15 ENCOUNTER — INBOUND DOCUMENT (OUTPATIENT)
Age: 74
End: 2019-04-15

## 2019-04-22 ENCOUNTER — INBOUND DOCUMENT (OUTPATIENT)
Age: 74
End: 2019-04-22

## 2019-05-07 ENCOUNTER — APPOINTMENT (OUTPATIENT)
Dept: INFUSION THERAPY | Facility: HOSPITAL | Age: 74
End: 2019-05-07

## 2019-05-07 ENCOUNTER — OUTPATIENT (OUTPATIENT)
Dept: OUTPATIENT SERVICES | Facility: HOSPITAL | Age: 74
LOS: 1 days | End: 2019-05-07
Payer: MEDICARE

## 2019-05-07 VITALS
HEIGHT: 57 IN | WEIGHT: 106.04 LBS | OXYGEN SATURATION: 98 % | TEMPERATURE: 99 F | SYSTOLIC BLOOD PRESSURE: 124 MMHG | HEART RATE: 62 BPM | RESPIRATION RATE: 16 BRPM | DIASTOLIC BLOOD PRESSURE: 68 MMHG

## 2019-05-07 DIAGNOSIS — M81.0 AGE-RELATED OSTEOPOROSIS WITHOUT CURRENT PATHOLOGICAL FRACTURE: ICD-10-CM

## 2019-05-07 DIAGNOSIS — Z98.89 OTHER SPECIFIED POSTPROCEDURAL STATES: Chronic | ICD-10-CM

## 2019-05-07 PROCEDURE — 96372 THER/PROPH/DIAG INJ SC/IM: CPT

## 2019-05-07 RX ORDER — DENOSUMAB 60 MG/ML
60 INJECTION SUBCUTANEOUS ONCE
Qty: 0 | Refills: 0 | Status: COMPLETED | OUTPATIENT
Start: 2019-05-07 | End: 2019-05-07

## 2019-05-07 RX ADMIN — DENOSUMAB 60 MILLIGRAM(S): 60 INJECTION SUBCUTANEOUS at 09:00

## 2019-05-13 ENCOUNTER — INBOUND DOCUMENT (OUTPATIENT)
Age: 74
End: 2019-05-13

## 2019-05-14 ENCOUNTER — INBOUND DOCUMENT (OUTPATIENT)
Age: 74
End: 2019-05-14

## 2019-06-18 NOTE — HISTORY OF PRESENT ILLNESS
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 No extension or lymphadenopathy. Biopsy recommended\par 3) Vaginal biopsy 11/4/15\par      a) High grade adenocarcinoma, endometrioid type, favor endometrial primary\par 4) PET/CT 11/10/15\par     a) Hypermetabolic mass with the posterior wall of the lower uterine segment 4x5.1x2cm. Smaller lesion located in the posterior vaginal wall seen as an enhancing mass measuring 1.1x1.7cm is also hypermetabolic. No lymphadenopathy noted. There is hypermetabolic lesion within the right lung. Although this likely represents metastatic disease, a second primary cannot be excluded. \par 5) Lung Biopsy 11/19/15\par       a) high grade endometrial adenocarcinoma\par 6) Advanced robotic assisted hysterectomy, BSO, pelvic and para aortic lymph node dissection 11/24/15\par      a) Endometrial Adenocarcinoma, FIGO grade 3 %, +LVSI, +vaginal mass\par      b) Post operative course complicated by wound seroma\par 7) CT C/A/P 12/2015 (post op)\par      a) stable 1.5cm lung nodule\par 8) Paclitaxel 175mg/m2 and Carboplatin AUC 6 q21d x 6 cycles\par      a)1/5/16-4/19/16\par 9) CT C/A/P 5/9/16\par     a) Stable ground glass nodule 1.5cm in left upper lobe. \par 10) Vaginal Brachytherapy with Dr. Patel\par     a)initiated 4/ 2016- 5/12/16 completed \par 11)  Arimidex initiated 5/2016- current\par 12) CT Chest 8/12/16\par     a) Stable ground glass nodules 1.5cm in DANIEL, interval decrease in tree in bud nodularity and bronchiolectasis in RUL and RML, stable multiple subcentimeter nodules in LLL and LML, likely inflammatory or infectious.\par 13) CT C/A/P 10/26/16\par    a) Increase in the size and number of lung nodules bilaterally suspicious for metastatic disease.\par 14) Doxil 20mg/m2, Avastin 7.5mg/kg q14 days initiated 11/15/16 x 3 cycles\par      a) C3d15 held due to skin toxicity, 1/10/17\par 15) CT C/A/P 1/31/17\par     a) Mixed response. Several scattered stable pulmonary nodules, 4mm 2mm and two intervally enlarged pulmonary nodules 4mm (from 3mm), 3mm (from 1mm) Stable ground glass opacity in DANIEL 12mm \par 16) Hip Xray 3/7/17\par    a) Bilateral acute, minimally displaced superior pubic ramus fractures. Suspected acute non displaced left inferior pubic ramus fracture.\par 17) CT C/A/P 5/10/17\par    a) mild interval enlargement of pulmonary mets, no other disease\par 18) Faslodex initiated 6/7/17-8/20/17\par 19)CT CAP 9/19/2017\par    a)Significant progression of pulmonary metastasis to b/l pulmonary masses\par    b)Upper left lobe lesion previously 6mm now 12mm, RU lobe nodule previously 3mm now 7mm, and new mass on LL lobe of 2.3cm, nodule in JENNIFER lobe remains stable at 18mm since 5/2016\par 20)CTAP 3/07/2018\par    a)Stable small residua of b/l pulmonary mets\par    b)No new foci of disease \par 21)CTAP 6/28/18\par    a)Since 3/7/18 b/l pulmonary nodules unchanged\par    b)JEFF\par 22) CT CH/ABD/PEL 10/2018\par    a)Slightly increased size of RLL pulmonary mets now 0.6x0.6cm, previously 0.5x0.4cm.\par    b)DANIEL focal, nodular ground glass opacity spans 1.6x1.6cm increased from 1.3x1.3cm, both possible mets or possibly early or in situ lung ca\par 23)CTAP 11/14/2018\par    a)Since 10/2018 unchanged of 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 \par 24) CTAP 3/2019\par    a) increase in RLL pulmonary nodule\par \par \par Here for follow up. Completed Radiation to RLL node. \par \par Health Maintenance\par Mammo 12/2018- Birads 2\par colonoscopy-

## 2019-06-18 NOTE — LETTER BODY
[Dear  ___] : Dear ~EFRAIN, [Attached please find my note.] : Attached please find my note. [Dear  ___] : Dear  [unfilled],

## 2019-06-18 NOTE — PHYSICAL EXAM
[Normal] : No focal neurologic defects observed [de-identified] : well healed lapsy incisions [de-identified] : bilateral lymphedema worsened with bilateral pitting edema to the knee, skin intact [Restricted in physically strenuous activity but ambulatory and able to carry out work of a light or sedentary nature] : Status 1- Restricted in physically strenuous activity but ambulatory and able to carry out work of a light or sedentary nature, e.g., light house work, office work

## 2019-06-19 ENCOUNTER — APPOINTMENT (OUTPATIENT)
Dept: GYNECOLOGIC ONCOLOGY | Facility: CLINIC | Age: 74
End: 2019-06-19
Payer: MEDICARE

## 2019-06-19 VITALS
HEART RATE: 64 BPM | HEIGHT: 56 IN | SYSTOLIC BLOOD PRESSURE: 188 MMHG | BODY MASS INDEX: 23.62 KG/M2 | WEIGHT: 105 LBS | DIASTOLIC BLOOD PRESSURE: 95 MMHG | OXYGEN SATURATION: 78 %

## 2019-06-19 DIAGNOSIS — R91.8 OTHER NONSPECIFIC ABNORMAL FINDING OF LUNG FIELD: ICD-10-CM

## 2019-06-19 DIAGNOSIS — I89.0 LYMPHEDEMA, NOT ELSEWHERE CLASSIFIED: ICD-10-CM

## 2019-06-19 PROCEDURE — 99214 OFFICE O/P EST MOD 30 MIN: CPT

## 2019-07-22 ENCOUNTER — INBOUND DOCUMENT (OUTPATIENT)
Age: 74
End: 2019-07-22

## 2019-08-26 ENCOUNTER — INBOUND DOCUMENT (OUTPATIENT)
Age: 74
End: 2019-08-26

## 2019-09-05 ENCOUNTER — INBOUND DOCUMENT (OUTPATIENT)
Age: 74
End: 2019-09-05

## 2019-09-23 ENCOUNTER — INBOUND DOCUMENT (OUTPATIENT)
Age: 74
End: 2019-09-23

## 2019-10-21 ENCOUNTER — INBOUND DOCUMENT (OUTPATIENT)
Age: 74
End: 2019-10-21

## 2019-12-09 ENCOUNTER — APPOINTMENT (OUTPATIENT)
Dept: GYNECOLOGIC ONCOLOGY | Facility: CLINIC | Age: 74
End: 2019-12-09
Payer: MEDICARE

## 2019-12-09 VITALS
HEART RATE: 66 BPM | OXYGEN SATURATION: 91 % | BODY MASS INDEX: 22.22 KG/M2 | DIASTOLIC BLOOD PRESSURE: 90 MMHG | HEIGHT: 57 IN | WEIGHT: 103 LBS | SYSTOLIC BLOOD PRESSURE: 158 MMHG

## 2019-12-09 DIAGNOSIS — Z00.00 ENCOUNTER FOR GENERAL ADULT MEDICAL EXAMINATION W/OUT ABNORMAL FINDINGS: ICD-10-CM

## 2019-12-09 PROCEDURE — 99214 OFFICE O/P EST MOD 30 MIN: CPT

## 2019-12-09 NOTE — PHYSICAL EXAM
[de-identified] : well healed lapsy incisions [de-identified] : bilateral lymphedema with bilateral pitting edema to the knee, skin intact

## 2019-12-09 NOTE — DISCUSSION/SUMMARY
[FreeTextEntry1] : Pelvic exam wnl\par Agree with management at [facility] [place]\par Continue Fulvestrant and repeat Chest CT in February\par Rx given for Mammogram/US now

## 2019-12-09 NOTE — HISTORY OF PRESENT ILLNESS
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 No extension or lymphadenopathy. Biopsy recommended\par 3) Vaginal biopsy 11/4/15\par      a) High grade adenocarcinoma, endometrioid type, favor endometrial primary\par 4) PET/CT 11/10/15\par     a) Hypermetabolic mass with the posterior wall of the lower uterine segment 4x5.1x2cm. Smaller lesion located in the posterior vaginal wall seen as an enhancing mass measuring 1.1x1.7cm is also hypermetabolic. No lymphadenopathy noted. There is hypermetabolic lesion within the right lung. Although this likely represents metastatic disease, a second primary cannot be excluded. \par 5) Lung Biopsy 11/19/15\par       a) high grade endometrial adenocarcinoma\par 6) Advanced robotic assisted hysterectomy, BSO, pelvic and para aortic lymph node dissection 11/24/15\par      a) Endometrial Adenocarcinoma, FIGO grade 3 %, +LVSI, +vaginal mass\par      b) Post operative course complicated by wound seroma\par 7) CT C/A/P 12/2015 (post op)\par      a) stable 1.5cm lung nodule\par 8) Paclitaxel 175mg/m2 and Carboplatin AUC 6 q21d x 6 cycles\par      a)1/5/16-4/19/16\par 9) CT C/A/P 5/9/16\par     a) Stable ground glass nodule 1.5cm in left upper lobe. \par 10) Vaginal Brachytherapy with Dr. Patel\par     a)initiated 4/ 2016- 5/12/16 completed \par 11)  Arimidex initiated 5/2016- current\par 12) CT Chest 8/12/16\par     a) Stable ground glass nodules 1.5cm in DANIEL, interval decrease in tree in bud nodularity and bronchiolectasis in RUL and RML, stable multiple subcentimeter nodules in LLL and LML, likely inflammatory or infectious.\par 13) CT C/A/P 10/26/16\par    a) Increase in the size and number of lung nodules bilaterally suspicious for metastatic disease.\par 14) Doxil 20mg/m2, Avastin 7.5mg/kg q14 days initiated 11/15/16 x 3 cycles\par      a) C3d15 held due to skin toxicity, 1/10/17\par 15) CT C/A/P 1/31/17\par     a) Mixed response. Several scattered stable pulmonary nodules, 4mm 2mm and two intervally enlarged pulmonary nodules 4mm (from 3mm), 3mm (from 1mm) Stable ground glass opacity in DANIEL 12mm \par 16) Hip Xray 3/7/17\par    a) Bilateral acute, minimally displaced superior pubic ramus fractures. Suspected acute non displaced left inferior pubic ramus fracture.\par 17) CT C/A/P 5/10/17\par    a) mild interval enlargement of pulmonary mets, no other disease\par 18) Faslodex initiated 6/7/17-8/20/17\par 19)CT CAP 9/19/2017\par    a)Significant progression of pulmonary metastasis to b/l pulmonary masses\par    b)Upper left lobe lesion previously 6mm now 12mm, RU lobe nodule previously 3mm now 7mm, and new mass on LL lobe of 2.3cm, nodule in JENNIFER lobe remains stable at 18mm since 5/2016\par 20)CTAP 3/07/2018\par    a)Stable small residua of b/l pulmonary mets\par    b)No new foci of disease \par 21)CTAP 6/28/18\par    a)Since 3/7/18 b/l pulmonary nodules unchanged\par    b)JEFF\par 22) CT CH/ABD/PEL 10/2018\par    a)Slightly increased size of RLL pulmonary mets now 0.6x0.6cm, previously 0.5x0.4cm.\par    b)DANIEL focal, nodular ground glass opacity spans 1.6x1.6cm increased from 1.3x1.3cm, both possible mets or possibly early or in situ lung ca\par 23)CTAP 11/14/2018\par    a)Since 10/2018 unchanged of 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 \par 24) CTAP 3/2019\par    a) increase in RLL pulmonary nodule\par 25) CT C/A/P 11/19/19\par     a) increased R perihilar/peribronchial soft tissue thickening and nodularity up to 0/9cm, new RLL ground glass opacity with nodularity 1.5cm\par    \par Here for follow up, feeling well. Continues Fulvestrant at [facility] [place]. Most recent chest CT had an increase in her lung lesion, but upon review seemed to be post radiation changes. Plan to repeat in 3 months. Lymphedema is stable. patient is working 4 full days a week now. \par \par Health Maintenance\par Mammo 12/2018- Birads 2\par

## 2019-12-10 ENCOUNTER — APPOINTMENT (OUTPATIENT)
Age: 74
End: 2019-12-10

## 2019-12-10 ENCOUNTER — OUTPATIENT (OUTPATIENT)
Dept: OUTPATIENT SERVICES | Facility: HOSPITAL | Age: 74
LOS: 1 days | End: 2019-12-10
Payer: MEDICARE

## 2019-12-10 ENCOUNTER — APPOINTMENT (OUTPATIENT)
Dept: ORTHOPEDIC SURGERY | Facility: CLINIC | Age: 74
End: 2019-12-10
Payer: MEDICARE

## 2019-12-10 VITALS
OXYGEN SATURATION: 98 % | HEART RATE: 62 BPM | HEIGHT: 56 IN | TEMPERATURE: 98 F | WEIGHT: 104.06 LBS | RESPIRATION RATE: 16 BRPM | DIASTOLIC BLOOD PRESSURE: 80 MMHG | SYSTOLIC BLOOD PRESSURE: 149 MMHG

## 2019-12-10 VITALS
BODY MASS INDEX: 23.39 KG/M2 | DIASTOLIC BLOOD PRESSURE: 80 MMHG | SYSTOLIC BLOOD PRESSURE: 140 MMHG | OXYGEN SATURATION: 99 % | WEIGHT: 104 LBS | HEART RATE: 62 BPM | HEIGHT: 56 IN

## 2019-12-10 DIAGNOSIS — Z98.89 OTHER SPECIFIED POSTPROCEDURAL STATES: Chronic | ICD-10-CM

## 2019-12-10 DIAGNOSIS — M81.0 AGE-RELATED OSTEOPOROSIS WITHOUT CURRENT PATHOLOGICAL FRACTURE: ICD-10-CM

## 2019-12-10 DIAGNOSIS — C54.1 MALIGNANT NEOPLASM OF ENDOMETRIUM: ICD-10-CM

## 2019-12-10 DIAGNOSIS — M81.0 AGE-RELATED OSTEOPOROSIS W/OUT CURRENT PATHOLOGICAL FRACTURE: ICD-10-CM

## 2019-12-10 PROCEDURE — 99215 OFFICE O/P EST HI 40 MIN: CPT

## 2019-12-10 PROCEDURE — 96372 THER/PROPH/DIAG INJ SC/IM: CPT

## 2019-12-10 RX ORDER — HYDROCHLOROTHIAZIDE 25 MG/1
25 TABLET ORAL TWICE DAILY
Qty: 180 | Refills: 3 | Status: DISCONTINUED | COMMUNITY
Start: 2019-03-20 | End: 2019-12-10

## 2019-12-10 RX ORDER — DENOSUMAB 60 MG/ML
60 INJECTION SUBCUTANEOUS ONCE
Refills: 0 | Status: COMPLETED | OUTPATIENT
Start: 2019-12-10 | End: 2019-12-10

## 2019-12-10 RX ADMIN — DENOSUMAB 60 MILLIGRAM(S): 60 INJECTION SUBCUTANEOUS at 11:22

## 2019-12-10 NOTE — ASSESSMENT
[FreeTextEntry1] : \par 74 year old woman with stage 4 metastatic endometrial adenocarcinoma, now living with disease for past 4 years, working part time and feeling generally well. The patient had osteoporosis and was treated for this many years prior to her diagnosis. It is probable that treatment with both regular chemotherapy, as well as aromatase inhibitor (Arimidex, albeit briefly) further exacerbated bone density. At present the patient is on an estrogen receptor antagonist and this is likely contributing to bone loss. She has sustained one fragility pelvic fracture. She has clinical osteoporosis with height loss and kyphoscoliosis. 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 Dr Ling had wondered about use of Forteo. I hesitate to use Forteo only because it is an "unknown" with regard to effect on bone metastasis. I did investigate and endometrial cancer does not typically metastasize to bone, but as a rule we try to avoid using Forteo in active cancer. \par Patient will get dose #5 of Prolia today. \par \par Plan:\par 1. dose #5 Prolia today,  f/u labs in  6 months after the dose, with likely dose #6

## 2020-03-17 ENCOUNTER — APPOINTMENT (OUTPATIENT)
Dept: ORTHOPEDIC SURGERY | Facility: CLINIC | Age: 75
End: 2020-03-17
Payer: MEDICARE

## 2020-03-17 DIAGNOSIS — M84.454K: ICD-10-CM

## 2020-03-17 DIAGNOSIS — M25.561 PAIN IN RIGHT KNEE: ICD-10-CM

## 2020-03-17 PROCEDURE — 72170 X-RAY EXAM OF PELVIS: CPT

## 2020-03-17 PROCEDURE — 99214 OFFICE O/P EST MOD 30 MIN: CPT

## 2020-03-17 PROCEDURE — 73560 X-RAY EXAM OF KNEE 1 OR 2: CPT | Mod: LT

## 2020-03-17 PROCEDURE — 73564 X-RAY EXAM KNEE 4 OR MORE: CPT | Mod: RT

## 2020-03-17 NOTE — ASSESSMENT
[FreeTextEntry1] : 75-year-old with acute RIGHT medial knee pain that started Last week. There is no injury. Differential diagnosis includes aggravation of mild underlying arthritis, degenerative medial meniscus tear or insufficiency fracture RIGHT knee.\par It is getting progressively better.\par I recommended relative rest. She is going to be working from home now anyhow which will be helpful. She should walk with a cane for the time being. Heat and ice. Tylenol or ibuprofen as needed.\par Followup in 4 weeks approximately if it's not getting better or certainly if it's getting worse in which case we'll need to work it up further. We may consider corticosteroid injection. If there is insufficiency fracture then she may need to go on the walker.\par Her pelvis where she had the pathologic fractures is doing well with minimal occasional symptoms. It appears stable on x-ray.\par followup 4 weeks or as needed

## 2020-03-17 NOTE — HISTORY OF PRESENT ILLNESS
[de-identified] : Patient is now 75 years old. She has grade 3 endometrial carcinoma.\par She comes in for RIGHT medial knee pain that started last week without any injury.\par There was no injury or inciting event. She has some pain when walking and stepping and it's tender on the inner part of the knee.No prior knee issues.\par Her pelvis where she had the prior fractures has been feeling relatively good. No significant pain at all. She is walking without a cane or walker.\par

## 2020-03-17 NOTE — PHYSICAL EXAM
[LE] : Sensory: Intact in bilateral lower extremities [Normal RLE] : Right Lower Extremity: No scars, rashes, lesions, ulcers, skin intact [Normal LLE] : Left Lower Extremity: No scars, rashes, lesions, ulcers, skin intact [Normal Touch] : sensation intact for touch [Normal] : No swelling, no edema, normal pedal pulses and normal temperature [de-identified] : Knees:\par slight limp.Mild pain. RIGHT knee with walking\par Trace RIGHT knee effusion.Significant lymphedema bilateral lower legs\par - erythema, edema, warmth.\par Tender RIGHT knee medial joint line/Medial femoral condyle. No significant tenderness LEFT knee or elsewhere.\par ROM: 0 degrees extension to 125 degrees flexion. No significant crepitus\par - Ellyn.\par 1A Lachman.   Normal rotational, varus/valgus laxity.\par Intact extensor mechanism.\par NVI distally.\par no significant pelvis tenderness [de-identified] : \par AP pelvic x-ray shows prior pubic rami fractures with incomplete healing of the RIGHT superior pubic ramus no progress since last x-ray and possibly with some bridging bone.\par \par X-rays RIGHT knee weightbearing 4 views with a comparison AP view of the LEFT knee shows perhaps slight narrowing of the medial joint space but otherwise no calcifications, bone lesions or significant arthritis. Osteopenia

## 2021-01-12 ENCOUNTER — APPOINTMENT (OUTPATIENT)
Dept: GYNECOLOGIC ONCOLOGY | Facility: CLINIC | Age: 76
End: 2021-01-12
Payer: MEDICARE

## 2021-01-12 ENCOUNTER — APPOINTMENT (OUTPATIENT)
Dept: GYNECOLOGIC ONCOLOGY | Facility: CLINIC | Age: 76
End: 2021-01-12

## 2021-01-12 PROCEDURE — 99214 OFFICE O/P EST MOD 30 MIN: CPT | Mod: 95

## 2021-01-12 NOTE — PHYSICAL EXAM
[Absent] : Adnexa(ae): Absent [Normal] : Anus and perineum: Normal sphincter tone, no masses, no prolapse. [Restricted in physically strenuous activity but ambulatory and able to carry out work of a light or sedentary nature] : Status 1- Restricted in physically strenuous activity but ambulatory and able to carry out work of a light or sedentary nature, e.g., light house work, office work [de-identified] : well healed lapsy incisions [de-identified] : bilateral lymphedema with bilateral pitting edema to the knee, skin intact

## 2021-01-12 NOTE — REASON FOR VISIT
[FreeTextEntry1] : Surveillance\par Visit done via telehealth due to COVID19 precautions\par consent obtained\par [place] at [place] in [place], [place] in office in [place]

## 2021-01-12 NOTE — HISTORY OF PRESENT ILLNESS
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 No extension or lymphadenopathy. Biopsy recommended\par 3) Vaginal biopsy 11/4/15\par      a) High grade adenocarcinoma, endometrioid type, favor endometrial primary\par 4) PET/CT 11/10/15\par     a) Hypermetabolic mass with the posterior wall of the lower uterine segment 4x5.1x2cm. Smaller lesion located in the posterior vaginal wall seen as an enhancing mass measuring 1.1x1.7cm is also hypermetabolic. No lymphadenopathy noted. There is hypermetabolic lesion within the right lung. Although this likely represents metastatic disease, a second primary cannot be excluded. \par 5) Lung Biopsy 11/19/15\par       a) high grade endometrial adenocarcinoma\par 6) Advanced robotic assisted hysterectomy, BSO, pelvic and para aortic lymph node dissection 11/24/15\par      a) Endometrial Adenocarcinoma, FIGO grade 3 %, +LVSI, +vaginal mass\par      b) Post operative course complicated by wound seroma\par 7) CT C/A/P 12/2015 (post op)\par      a) stable 1.5cm lung nodule\par 8) Paclitaxel 175mg/m2 and Carboplatin AUC 6 q21d x 6 cycles\par      a)1/5/16-4/19/16\par 9) CT C/A/P 5/9/16\par     a) Stable ground glass nodule 1.5cm in left upper lobe. \par 10) Vaginal Brachytherapy with Dr. Patel\par     a)initiated 4/ 2016- 5/12/16 completed \par 11)  Arimidex initiated 5/2016- current\par 12) CT Chest 8/12/16\par     a) Stable ground glass nodules 1.5cm in DANIEL, interval decrease in tree in bud nodularity and bronchiolectasis in RUL and RML, stable multiple subcentimeter nodules in LLL and LML, likely inflammatory or infectious.\par 13) CT C/A/P 10/26/16\par    a) Increase in the size and number of lung nodules bilaterally suspicious for metastatic disease.\par 14) Doxil 20mg/m2, Avastin 7.5mg/kg q14 days initiated 11/15/16 x 3 cycles\par      a) C3d15 held due to skin toxicity, 1/10/17\par 15) CT C/A/P 1/31/17\par     a) Mixed response. Several scattered stable pulmonary nodules, 4mm 2mm and two intervally enlarged pulmonary nodules 4mm (from 3mm), 3mm (from 1mm) Stable ground glass opacity in DANIEL 12mm \par 16) Hip Xray 3/7/17\par    a) Bilateral acute, minimally displaced superior pubic ramus fractures. Suspected acute non displaced left inferior pubic ramus fracture.\par 17) CT C/A/P 5/10/17\par    a) mild interval enlargement of pulmonary mets, no other disease\par 18) Faslodex initiated 6/7/17-8/20/17\par 19)CT CAP 9/19/2017\par    a)Significant progression of pulmonary metastasis to b/l pulmonary masses\par    b)Upper left lobe lesion previously 6mm now 12mm, RU lobe nodule previously 3mm now 7mm, and new mass on LL lobe of 2.3cm, nodule in JENNIFER lobe remains stable at 18mm since 5/2016\par 20)CTAP 3/07/2018\par    a)Stable small residua of b/l pulmonary mets\par    b)No new foci of disease \par 21)CTAP 6/28/18\par    a)Since 3/7/18 b/l pulmonary nodules unchanged\par    b)JEFF\par 22) CT CH/ABD/PEL 10/2018\par    a)Slightly increased size of RLL pulmonary mets now 0.6x0.6cm, previously 0.5x0.4cm.\par    b)DANIEL focal, nodular ground glass opacity spans 1.6x1.6cm increased from 1.3x1.3cm, both possible mets or possibly early or in situ lung ca\par 23)CTAP 11/14/2018\par    a)Since 10/2018 unchanged of 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 \par 24) CTAP 3/2019\par    a) increase in RLL pulmonary nodule\par 25) CT C/A/P 11/19/19\par     a) increased R perihilar/peribronchial soft tissue thickening and nodularity up to 0/9cm, new RLL ground glass opacity with nodularity 1.5cm\par    \par Here for follow up, feeling well. [place] [facility] [place]. Had a Chest CT in September and another planned next week. Still struggling with lymphedema and [place] [facility] [place] Lymphedema hasn't improved at all but she is able to manage it now with wraps/compression garments. \par \par Health Maintenance\par Mammo 12/2018- Birads 2\par

## 2021-01-12 NOTE — DISCUSSION/SUMMARY
[Reviewed Clinical Lab Test(s)] : Results of clinical tests were reviewed. [FreeTextEntry1] : \par Agree with management at [facility] [place]\par follow up in 6 months\par Rx given for Mammogram/US now

## 2021-02-25 ENCOUNTER — TRANSCRIPTION ENCOUNTER (OUTPATIENT)
Age: 76
End: 2021-02-25

## 2022-03-22 DIAGNOSIS — Z12.39 ENCOUNTER FOR OTHER SCREENING FOR MALIGNANT NEOPLASM OF BREAST: ICD-10-CM

## 2022-04-06 ENCOUNTER — NON-APPOINTMENT (OUTPATIENT)
Age: 77
End: 2022-04-06

## 2022-04-06 DIAGNOSIS — N63.20 UNSPECIFIED LUMP IN THE LEFT BREAST, UNSPECIFIED QUADRANT: ICD-10-CM

## 2022-04-06 DIAGNOSIS — R92.1 MAMMOGRAPHIC CALCIFICATION FOUND ON DIAGNOSTIC IMAGING OF BREAST: ICD-10-CM

## 2022-04-11 ENCOUNTER — NON-APPOINTMENT (OUTPATIENT)
Age: 77
End: 2022-04-11

## 2022-04-14 ENCOUNTER — NON-APPOINTMENT (OUTPATIENT)
Age: 77
End: 2022-04-14

## 2022-05-06 DIAGNOSIS — R92.8 OTHER ABNORMAL AND INCONCLUSIVE FINDINGS ON DIAGNOSTIC IMAGING OF BREAST: ICD-10-CM

## 2024-09-20 NOTE — ED ADULT TRIAGE NOTE - RESPIRATORY RATE (BREATHS/MIN)
Kelsey Estrada was seen and treated in our emergency department on 9/20/2024.                Diagnosis:     Kelsey  may return to work on return date.    She may return on this date: 09/23/2024         If you have any questions or concerns, please don't hesitate to call.      Mariajose Pedroza RN    ______________________________           _______________          _______________  Hospital Representative                              Date                                Time
18

## 2024-10-13 ENCOUNTER — NON-APPOINTMENT (OUTPATIENT)
Age: 79
End: 2024-10-13